# Patient Record
Sex: FEMALE | Race: WHITE | NOT HISPANIC OR LATINO | Employment: FULL TIME | URBAN - METROPOLITAN AREA
[De-identification: names, ages, dates, MRNs, and addresses within clinical notes are randomized per-mention and may not be internally consistent; named-entity substitution may affect disease eponyms.]

---

## 2020-03-01 ENCOUNTER — TRANSFERRED RECORDS (OUTPATIENT)
Dept: HEALTH INFORMATION MANAGEMENT | Facility: CLINIC | Age: 35
End: 2020-03-01

## 2020-04-24 ENCOUNTER — APPOINTMENT (OUTPATIENT)
Dept: MRI IMAGING | Facility: CLINIC | Age: 35
End: 2020-04-24
Attending: EMERGENCY MEDICINE
Payer: COMMERCIAL

## 2020-04-24 ENCOUNTER — APPOINTMENT (OUTPATIENT)
Dept: CT IMAGING | Facility: CLINIC | Age: 35
End: 2020-04-24
Attending: EMERGENCY MEDICINE
Payer: COMMERCIAL

## 2020-04-24 ENCOUNTER — HOSPITAL ENCOUNTER (INPATIENT)
Facility: CLINIC | Age: 35
LOS: 2 days | Discharge: HOME | End: 2020-04-26
Attending: EMERGENCY MEDICINE | Admitting: INTERNAL MEDICINE
Payer: COMMERCIAL

## 2020-04-24 DIAGNOSIS — E87.6 HYPOKALEMIA: ICD-10-CM

## 2020-04-24 DIAGNOSIS — R29.898 TRANSIENT LEFT LEG WEAKNESS: ICD-10-CM

## 2020-04-24 DIAGNOSIS — Q21.12 PFO (PATENT FORAMEN OVALE): ICD-10-CM

## 2020-04-24 DIAGNOSIS — H53.123 TRANSIENT VISUAL LOSS OF BOTH EYES: ICD-10-CM

## 2020-04-24 DIAGNOSIS — I63.9 ACUTE CVA (CEREBROVASCULAR ACCIDENT) (H): Primary | ICD-10-CM

## 2020-04-24 LAB
ALBUMIN SERPL-MCNC: 4.1 G/DL (ref 3.4–5)
ALP SERPL-CCNC: 84 U/L (ref 40–150)
ALT SERPL W P-5'-P-CCNC: 22 U/L (ref 0–50)
ANION GAP SERPL CALCULATED.3IONS-SCNC: 6 MMOL/L (ref 3–14)
APTT PPP: 26 SEC (ref 22–37)
AST SERPL W P-5'-P-CCNC: 16 U/L (ref 0–45)
BASOPHILS # BLD AUTO: 0.1 10E9/L (ref 0–0.2)
BASOPHILS NFR BLD AUTO: 0.7 %
BILIRUB DIRECT SERPL-MCNC: 0.2 MG/DL (ref 0–0.2)
BILIRUB SERPL-MCNC: 0.9 MG/DL (ref 0.2–1.3)
BUN SERPL-MCNC: 11 MG/DL (ref 7–30)
CALCIUM SERPL-MCNC: 8.6 MG/DL (ref 8.5–10.1)
CHLORIDE SERPL-SCNC: 108 MMOL/L (ref 94–109)
CO2 SERPL-SCNC: 27 MMOL/L (ref 20–32)
CREAT SERPL-MCNC: 0.8 MG/DL (ref 0.52–1.04)
DIFFERENTIAL METHOD BLD: NORMAL
EOSINOPHIL # BLD AUTO: 0.5 10E9/L (ref 0–0.7)
EOSINOPHIL NFR BLD AUTO: 5 %
ERYTHROCYTE [DISTWIDTH] IN BLOOD BY AUTOMATED COUNT: 13.9 % (ref 10–15)
GFR SERPL CREATININE-BSD FRML MDRD: >90 ML/MIN/{1.73_M2}
GLUCOSE BLDC GLUCOMTR-MCNC: 105 MG/DL (ref 70–99)
GLUCOSE SERPL-MCNC: 94 MG/DL (ref 70–99)
HBA1C MFR BLD: 4.9 % (ref 0–5.6)
HCG SERPL QL: NEGATIVE
HCT VFR BLD AUTO: 42.9 % (ref 35–47)
HGB BLD-MCNC: 14.3 G/DL (ref 11.7–15.7)
IMM GRANULOCYTES # BLD: 0 10E9/L (ref 0–0.4)
IMM GRANULOCYTES NFR BLD: 0.3 %
INR PPP: 1.09 (ref 0.86–1.14)
LYMPHOCYTES # BLD AUTO: 2.9 10E9/L (ref 0.8–5.3)
LYMPHOCYTES NFR BLD AUTO: 32.2 %
MCH RBC QN AUTO: 30.9 PG (ref 26.5–33)
MCHC RBC AUTO-ENTMCNC: 33.3 G/DL (ref 31.5–36.5)
MCV RBC AUTO: 93 FL (ref 78–100)
MONOCYTES # BLD AUTO: 0.8 10E9/L (ref 0–1.3)
MONOCYTES NFR BLD AUTO: 9.3 %
NEUTROPHILS # BLD AUTO: 4.7 10E9/L (ref 1.6–8.3)
NEUTROPHILS NFR BLD AUTO: 52.5 %
NRBC # BLD AUTO: 0 10*3/UL
NRBC BLD AUTO-RTO: 0 /100
PLATELET # BLD AUTO: 260 10E9/L (ref 150–450)
POTASSIUM SERPL-SCNC: 2.8 MMOL/L (ref 3.4–5.3)
PROT SERPL-MCNC: 7.5 G/DL (ref 6.8–8.8)
RBC # BLD AUTO: 4.63 10E12/L (ref 3.8–5.2)
SARS-COV-2 RNA SPEC QL NAA+PROBE: NORMAL
SODIUM SERPL-SCNC: 141 MMOL/L (ref 133–144)
SPECIMEN SOURCE: NORMAL
TROPONIN I SERPL-MCNC: <0.015 UG/L (ref 0–0.04)
TSH SERPL DL<=0.005 MIU/L-ACNC: 1.48 MU/L (ref 0.4–4)
WBC # BLD AUTO: 8.9 10E9/L (ref 4–11)

## 2020-04-24 PROCEDURE — 25000132 ZZH RX MED GY IP 250 OP 250 PS 637: Performed by: PHYSICIAN ASSISTANT

## 2020-04-24 PROCEDURE — 99291 CRITICAL CARE FIRST HOUR: CPT | Mod: 25

## 2020-04-24 PROCEDURE — A9585 GADOBUTROL INJECTION: HCPCS | Performed by: EMERGENCY MEDICINE

## 2020-04-24 PROCEDURE — 25500064 ZZH RX 255 OP 636: Performed by: EMERGENCY MEDICINE

## 2020-04-24 PROCEDURE — 80076 HEPATIC FUNCTION PANEL: CPT | Performed by: EMERGENCY MEDICINE

## 2020-04-24 PROCEDURE — 85730 THROMBOPLASTIN TIME PARTIAL: CPT | Performed by: EMERGENCY MEDICINE

## 2020-04-24 PROCEDURE — 70450 CT HEAD/BRAIN W/O DYE: CPT

## 2020-04-24 PROCEDURE — 93005 ELECTROCARDIOGRAM TRACING: CPT

## 2020-04-24 PROCEDURE — G0509 CRIT CARE TELEHEA CONSULT 50: HCPCS | Performed by: PHYSICIAN ASSISTANT

## 2020-04-24 PROCEDURE — 87635 SARS-COV-2 COVID-19 AMP PRB: CPT | Performed by: EMERGENCY MEDICINE

## 2020-04-24 PROCEDURE — 83036 HEMOGLOBIN GLYCOSYLATED A1C: CPT | Performed by: EMERGENCY MEDICINE

## 2020-04-24 PROCEDURE — 25000125 ZZHC RX 250: Performed by: EMERGENCY MEDICINE

## 2020-04-24 PROCEDURE — 25000128 H RX IP 250 OP 636: Performed by: EMERGENCY MEDICINE

## 2020-04-24 PROCEDURE — 00000146 ZZHCL STATISTIC GLUCOSE BY METER IP

## 2020-04-24 PROCEDURE — 84484 ASSAY OF TROPONIN QUANT: CPT | Performed by: PHYSICIAN ASSISTANT

## 2020-04-24 PROCEDURE — 84703 CHORIONIC GONADOTROPIN ASSAY: CPT | Performed by: EMERGENCY MEDICINE

## 2020-04-24 PROCEDURE — 84443 ASSAY THYROID STIM HORMONE: CPT | Performed by: EMERGENCY MEDICINE

## 2020-04-24 PROCEDURE — 82947 ASSAY GLUCOSE BLOOD QUANT: CPT | Performed by: PHYSICIAN ASSISTANT

## 2020-04-24 PROCEDURE — 84484 ASSAY OF TROPONIN QUANT: CPT | Performed by: EMERGENCY MEDICINE

## 2020-04-24 PROCEDURE — 96366 THER/PROPH/DIAG IV INF ADDON: CPT

## 2020-04-24 PROCEDURE — 25000132 ZZH RX MED GY IP 250 OP 250 PS 637: Performed by: INTERNAL MEDICINE

## 2020-04-24 PROCEDURE — 25000132 ZZH RX MED GY IP 250 OP 250 PS 637: Performed by: EMERGENCY MEDICINE

## 2020-04-24 PROCEDURE — 80048 BASIC METABOLIC PNL TOTAL CA: CPT | Performed by: EMERGENCY MEDICINE

## 2020-04-24 PROCEDURE — 70553 MRI BRAIN STEM W/O & W/DYE: CPT

## 2020-04-24 PROCEDURE — 0042T CT HEAD PERFUSION WITH CONTRAST: CPT

## 2020-04-24 PROCEDURE — 96365 THER/PROPH/DIAG IV INF INIT: CPT | Mod: 59

## 2020-04-24 PROCEDURE — 12000000 ZZH R&B MED SURG/OB

## 2020-04-24 PROCEDURE — 99222 1ST HOSP IP/OBS MODERATE 55: CPT | Mod: AI | Performed by: PHYSICIAN ASSISTANT

## 2020-04-24 PROCEDURE — 84132 ASSAY OF SERUM POTASSIUM: CPT | Performed by: PHYSICIAN ASSISTANT

## 2020-04-24 PROCEDURE — 70496 CT ANGIOGRAPHY HEAD: CPT

## 2020-04-24 PROCEDURE — 85025 COMPLETE CBC W/AUTO DIFF WBC: CPT | Performed by: EMERGENCY MEDICINE

## 2020-04-24 PROCEDURE — 85610 PROTHROMBIN TIME: CPT | Performed by: EMERGENCY MEDICINE

## 2020-04-24 RX ORDER — ASPIRIN 325 MG
325 TABLET ORAL DAILY
Qty: 14 TABLET | Refills: 0 | Status: SHIPPED | OUTPATIENT
Start: 2020-04-24 | End: 2020-06-18

## 2020-04-24 RX ORDER — PROCHLORPERAZINE MALEATE 5 MG
10 TABLET ORAL EVERY 6 HOURS PRN
Status: DISCONTINUED | OUTPATIENT
Start: 2020-04-24 | End: 2020-04-26 | Stop reason: HOSPADM

## 2020-04-24 RX ORDER — ONDANSETRON 4 MG/1
4 TABLET, ORALLY DISINTEGRATING ORAL EVERY 6 HOURS PRN
Status: DISCONTINUED | OUTPATIENT
Start: 2020-04-24 | End: 2020-04-26 | Stop reason: HOSPADM

## 2020-04-24 RX ORDER — NICOTINE 21 MG/24HR
1 PATCH, TRANSDERMAL 24 HOURS TRANSDERMAL DAILY
Status: DISCONTINUED | OUTPATIENT
Start: 2020-04-24 | End: 2020-04-26 | Stop reason: HOSPADM

## 2020-04-24 RX ORDER — HYDROXYZINE HYDROCHLORIDE 25 MG/1
25-50 TABLET, FILM COATED ORAL EVERY 6 HOURS PRN
Status: DISCONTINUED | OUTPATIENT
Start: 2020-04-24 | End: 2020-04-26 | Stop reason: HOSPADM

## 2020-04-24 RX ORDER — POTASSIUM CHLORIDE 1500 MG/1
40 TABLET, EXTENDED RELEASE ORAL ONCE
Status: COMPLETED | OUTPATIENT
Start: 2020-04-24 | End: 2020-04-24

## 2020-04-24 RX ORDER — HYDRALAZINE HYDROCHLORIDE 20 MG/ML
10-20 INJECTION INTRAMUSCULAR; INTRAVENOUS
Status: DISCONTINUED | OUTPATIENT
Start: 2020-04-24 | End: 2020-04-26 | Stop reason: HOSPADM

## 2020-04-24 RX ORDER — AMOXICILLIN 250 MG
2 CAPSULE ORAL 2 TIMES DAILY PRN
Status: DISCONTINUED | OUTPATIENT
Start: 2020-04-24 | End: 2020-04-26 | Stop reason: HOSPADM

## 2020-04-24 RX ORDER — OXYCODONE HYDROCHLORIDE 5 MG/1
5-10 TABLET ORAL EVERY 4 HOURS PRN
Status: DISCONTINUED | OUTPATIENT
Start: 2020-04-24 | End: 2020-04-26 | Stop reason: HOSPADM

## 2020-04-24 RX ORDER — POTASSIUM CHLORIDE 1500 MG/1
20-40 TABLET, EXTENDED RELEASE ORAL
Status: DISCONTINUED | OUTPATIENT
Start: 2020-04-24 | End: 2020-04-26 | Stop reason: HOSPADM

## 2020-04-24 RX ORDER — POTASSIUM CHLORIDE 29.8 MG/ML
20 INJECTION INTRAVENOUS
Status: DISCONTINUED | OUTPATIENT
Start: 2020-04-24 | End: 2020-04-26 | Stop reason: HOSPADM

## 2020-04-24 RX ORDER — BISACODYL 10 MG
10 SUPPOSITORY, RECTAL RECTAL DAILY PRN
Status: DISCONTINUED | OUTPATIENT
Start: 2020-04-24 | End: 2020-04-26 | Stop reason: HOSPADM

## 2020-04-24 RX ORDER — ACETAMINOPHEN 325 MG/1
650 TABLET ORAL EVERY 4 HOURS PRN
Status: DISCONTINUED | OUTPATIENT
Start: 2020-04-24 | End: 2020-04-26 | Stop reason: HOSPADM

## 2020-04-24 RX ORDER — POTASSIUM CL/LIDO/0.9 % NACL 10MEQ/0.1L
10 INTRAVENOUS SOLUTION, PIGGYBACK (ML) INTRAVENOUS ONCE
Status: COMPLETED | OUTPATIENT
Start: 2020-04-24 | End: 2020-04-24

## 2020-04-24 RX ORDER — POTASSIUM CL/LIDO/0.9 % NACL 10MEQ/0.1L
10 INTRAVENOUS SOLUTION, PIGGYBACK (ML) INTRAVENOUS
Status: DISCONTINUED | OUTPATIENT
Start: 2020-04-24 | End: 2020-04-26 | Stop reason: HOSPADM

## 2020-04-24 RX ORDER — POTASSIUM CHLORIDE 7.45 MG/ML
10 INJECTION INTRAVENOUS
Status: DISCONTINUED | OUTPATIENT
Start: 2020-04-24 | End: 2020-04-26 | Stop reason: HOSPADM

## 2020-04-24 RX ORDER — ASPIRIN 325 MG
325 TABLET ORAL DAILY
Status: DISCONTINUED | OUTPATIENT
Start: 2020-04-25 | End: 2020-04-26 | Stop reason: HOSPADM

## 2020-04-24 RX ORDER — PROCHLORPERAZINE 25 MG
25 SUPPOSITORY, RECTAL RECTAL EVERY 12 HOURS PRN
Status: DISCONTINUED | OUTPATIENT
Start: 2020-04-24 | End: 2020-04-26 | Stop reason: HOSPADM

## 2020-04-24 RX ORDER — ACETAMINOPHEN 325 MG/1
975 TABLET ORAL ONCE
Status: COMPLETED | OUTPATIENT
Start: 2020-04-24 | End: 2020-04-24

## 2020-04-24 RX ORDER — GADOBUTROL 604.72 MG/ML
7.5 INJECTION INTRAVENOUS ONCE
Status: COMPLETED | OUTPATIENT
Start: 2020-04-24 | End: 2020-04-24

## 2020-04-24 RX ORDER — NAPROXEN SODIUM 220 MG
220-440 TABLET ORAL 2 TIMES DAILY PRN
Status: ON HOLD | COMMUNITY
End: 2020-06-23

## 2020-04-24 RX ORDER — IBUPROFEN 600 MG/1
600 TABLET, FILM COATED ORAL EVERY 6 HOURS PRN
Status: DISCONTINUED | OUTPATIENT
Start: 2020-04-24 | End: 2020-04-26 | Stop reason: HOSPADM

## 2020-04-24 RX ORDER — LIDOCAINE 40 MG/G
CREAM TOPICAL
Status: DISCONTINUED | OUTPATIENT
Start: 2020-04-24 | End: 2020-04-26 | Stop reason: HOSPADM

## 2020-04-24 RX ORDER — LABETALOL 20 MG/4 ML (5 MG/ML) INTRAVENOUS SYRINGE
10-40 EVERY 10 MIN PRN
Status: DISCONTINUED | OUTPATIENT
Start: 2020-04-24 | End: 2020-04-26 | Stop reason: HOSPADM

## 2020-04-24 RX ORDER — GABAPENTIN 100 MG/1
100 CAPSULE ORAL 3 TIMES DAILY
COMMUNITY
End: 2020-06-18

## 2020-04-24 RX ORDER — POTASSIUM CHLORIDE 1.5 G/1.58G
20-40 POWDER, FOR SOLUTION ORAL
Status: DISCONTINUED | OUTPATIENT
Start: 2020-04-24 | End: 2020-04-26 | Stop reason: HOSPADM

## 2020-04-24 RX ORDER — LANOLIN ALCOHOL/MO/W.PET/CERES
3 CREAM (GRAM) TOPICAL
Status: DISCONTINUED | OUTPATIENT
Start: 2020-04-24 | End: 2020-04-26 | Stop reason: HOSPADM

## 2020-04-24 RX ORDER — ONDANSETRON 2 MG/ML
4 INJECTION INTRAMUSCULAR; INTRAVENOUS EVERY 6 HOURS PRN
Status: DISCONTINUED | OUTPATIENT
Start: 2020-04-24 | End: 2020-04-26 | Stop reason: HOSPADM

## 2020-04-24 RX ORDER — IOPAMIDOL 755 MG/ML
500 INJECTION, SOLUTION INTRAVASCULAR ONCE
Status: COMPLETED | OUTPATIENT
Start: 2020-04-24 | End: 2020-04-24

## 2020-04-24 RX ORDER — AMOXICILLIN 250 MG
1 CAPSULE ORAL 2 TIMES DAILY PRN
Status: DISCONTINUED | OUTPATIENT
Start: 2020-04-24 | End: 2020-04-26 | Stop reason: HOSPADM

## 2020-04-24 RX ORDER — ASPIRIN 325 MG
325 TABLET ORAL ONCE
Status: COMPLETED | OUTPATIENT
Start: 2020-04-24 | End: 2020-04-24

## 2020-04-24 RX ORDER — NALOXONE HYDROCHLORIDE 0.4 MG/ML
.1-.4 INJECTION, SOLUTION INTRAMUSCULAR; INTRAVENOUS; SUBCUTANEOUS
Status: DISCONTINUED | OUTPATIENT
Start: 2020-04-24 | End: 2020-04-26 | Stop reason: HOSPADM

## 2020-04-24 RX ADMIN — Medication 10 MEQ: at 15:59

## 2020-04-24 RX ADMIN — ASPIRIN 325 MG ORAL TABLET 325 MG: 325 PILL ORAL at 18:45

## 2020-04-24 RX ADMIN — POTASSIUM CHLORIDE 40 MEQ: 1500 TABLET, EXTENDED RELEASE ORAL at 15:59

## 2020-04-24 RX ADMIN — IOPAMIDOL 120 ML: 755 INJECTION, SOLUTION INTRAVENOUS at 15:06

## 2020-04-24 RX ADMIN — NICOTINE 1 PATCH: 14 PATCH, EXTENDED RELEASE TRANSDERMAL at 23:27

## 2020-04-24 RX ADMIN — SODIUM CHLORIDE 95 ML: 9 INJECTION, SOLUTION INTRAVENOUS at 15:06

## 2020-04-24 RX ADMIN — IBUPROFEN 600 MG: 600 TABLET, FILM COATED ORAL at 20:31

## 2020-04-24 RX ADMIN — ACETAMINOPHEN 975 MG: 325 TABLET, FILM COATED ORAL at 18:45

## 2020-04-24 RX ADMIN — GADOBUTROL 6.5 ML: 604.72 INJECTION INTRAVENOUS at 16:23

## 2020-04-24 ASSESSMENT — ENCOUNTER SYMPTOMS
DIZZINESS: 1
NERVOUS/ANXIOUS: 1
WEAKNESS: 1
LIGHT-HEADEDNESS: 1
HEADACHES: 1

## 2020-04-24 ASSESSMENT — ACTIVITIES OF DAILY LIVING (ADL)
ADLS_ACUITY_SCORE: 11
SWALLOWING: 0-->SWALLOWS FOODS/LIQUIDS WITHOUT DIFFICULTY

## 2020-04-24 ASSESSMENT — MIFFLIN-ST. JEOR: SCORE: 1457.92

## 2020-04-24 NOTE — ED PROVIDER NOTES
History     Chief Complaint:  Vision loss    HPI   Teri Call is a 34 year old female who presents to the emergency department for evaluation of vision loss and headache. The patient reports she was at work about 30 minutes ago when she experienced sudden onset of sharp headache and global vision loss, which somewhat improved after several minutes. She denies loss of consciousness. Immediately following symptom onset her boss drove her to the emergency department. Currently she reports her left eye feels numb and she has significantly blurred vision, bilaterally, left worse than right. She continues to report sharp headache as well as lightheadedness, dizziness, and left sided weakness. She is very anxious and has difficulty focusing on anything side from the anxiety.     Allergies:  Sulfa     Medications:    Sertraline  Gabapentin      Past Medical History:    The patient denies any significant past medical history.    Past Surgical History:    The patient does not have any pertinent past surgical history.    Family History:    No past pertinent family history.    Social History:  The patient was accompanied to the ED by her boss.  Smoking Status: Never Smoker  Smokeless Tobacco: Never Used  Alcohol Use: Yes  Drug Use: No    Review of Systems   Eyes: Positive for visual disturbance.   Neurological: Positive for dizziness, weakness, light-headedness and headaches.   Psychiatric/Behavioral: The patient is nervous/anxious.    All other systems reviewed and are negative.    Physical Exam     Patient Vitals for the past 24 hrs:   BP Temp Temp src Pulse Resp SpO2   04/24/20 1437 -- 99.4  F (37.4  C) Temporal -- -- --   04/24/20 1432 132/81 -- -- 80 18 98 %       Physical Exam    General:   Pleasant, , anxious female.  HEENT:    Oropharynx is moist, without lesions or trismus.     Dentition is poor  Eyes:    Conjunctiva normal     PERRL     Left homonymous hemianopsia      Right and left eye will not move  left of midline      Probable left sided neglect  Neck:    Supple, no meningismus.     CV:     Regular rate and rhythm.      No murmurs, rubs or gallops.       No unilateral leg swelling.       2+ radial pulses bilateral.    PULM:    Clear to auscultation bilateral.       No respiratory distress.      Good air exchange.  ABD:    Soft, non-tender, non-distended.       No pulsatile masses.       No rebound, guarding or rigidity.  MSK:     No gross deformity to all four extremities.   LYMPH:   No cervical lymphadenopathy.  NEURO:   Alert & O x 3.      CN II-XII intact,      Speech is clear with no aphasia.       Finger to nose within normal limits.  No pronator drift.       Strength is 4/5 LUE; 5/5 in RUE      3/5 LLE; 5/5 in RLE     No clonus     Down going Babinski bilateral     Sensation is intact.       Normal muscular tone, no tremor.  Skin:    Warm, dry and intact.    Psych:    Mood is good and affect is appropriate.        [employer] Stroke Scale  Exam Interval: Baseline   Score    Level of consciousness: (0)   Alert, keenly responsive    LOC questions: (0)   Answers both questions correctly    LOC commands: (0)   Performs both tasks correctly    Best gaze: (1)   Partial gaze palsy    Visual: (2)   Complete hemianopia    Facial palsy: (0)   Normal symmetrical movements    Motor arm (left): (0)   No drift    Motor arm (right): (0)   No drift    Motor leg (left): (2)   Some effort against gravity    Motor leg (right): (0)   No drift    Limb ataxia: (0)   Absent    Sensory: (0)   Normal- no sensory loss    Best language: (0)   Normal- no aphasia    Dysarthria: (0)   Normal    Extinction and inattention: (1)   Visual, tacile, auditory, spatial, person inattention        Total Score:  6     ABCD2 Score for TIA (calculator)  Background  Calculates overall risk of future TIA based on 5 factors: Age>=60, SBP>140/90, weakness, impaired speech, duration, diabetes.  Data  34 year old  does not have a  problem list on file.  Criteria   Of possible 7 points for 6 possible items  2 points: Unilateral Focal weakness  1 point: Duration 10-59 minutes  Interpretation  ABCD Score: 3  Points 0-3: Risk of CVA 1.0% within the subsequent 2 days of TIA        Emergency Department Course     ECG:  ECG taken at 1437, ECG read at 1441  Normal sinus rhythm with sinus arrhythmia   Normal ECG    Rate 80 bpm. RI interval 128 ms. QRS duration 88 ms. QT/QTc 394/454 ms. P-R-T axes 54 76 68.    Imaging:  Radiology findings were communicated with the patient who voiced understanding of the findings.    Head CT  IMPRESSION:   No evidence of acute intracranial hemorrhage, mass, or  herniation.      MARI PARADA MD    CT ANGIOGRAM OF THE HEAD AND NECK WITH CONTRAST  CT HEAD PERFUSION WITH CONTRAST  4/24/2020 3:07 PM     IMPRESSION:   1. Patent arteries in the head and neck without vascular cutoff. No  evidence of dissection. No aneurysm identified. No significant  stenosis.  2. CT perfusion images demonstrate questionable abnormality in the  right occipital lobe which may be artifactual. Given the history, this  could also represent an area of ischemia. This could be better  assessed with brain MR if the patient is able.     Results discussed with Johnny March at 3:09 PM on 4/24/2020.      MARI PARADA MD      Laboratory:  Laboratory findings were communicated with the patient who voiced understanding of the findings.    Troponin(1434): <0.015    Glucose by meter(1445): 105    BMP: Glucose 94, K 2.8 (low), o/w WNL (Creatinine: 0.8)  CBC: WBC: 8.9, HGB: 14.3, PLT: 260    Partial thromboplastin time: 26  INR: 1.09  HCG Qualitative Pregnancy: negative      Emergency Department Course:  Past medical records, nursing notes, and vitals reviewed.    1430 Patient arrived by person transport.    1431 I performed an exam of the patient as documented above.     1434 Code stroke initiated.    1436 Patient departed for CT.     EKG obtained in the  ED, see results above.     IV was inserted and blood was drawn for laboratory testing, results above.    The patient was sent for CT head perfusion with and without contrast and CTA head and neck while in the emergency department, results above.     Consult with stroke neurology    1509 I consulted with radiology, regarding the patient's history and presentation here in the emergency department.      1512 I rechecked the patient. Strength now 4/5 in LUE/LLE; improved from prior. Patient feeling improved. Tele-neurology evaluation active.    Consult with stroke neurology    1535 I rechecked the patient. EOMs and visual fields now intact.    1550  Changed over to Dr. Flores    Impression & Plan       Medical Decision Makin-year-old female presented to the ED with an onset of vision loss and weakness.  On exam she appeared to have left-sided neglect with left homonymous hemianopsia and left-sided hemiparesis with lower extremity greater than upper extremity involvement.  There are no obvious stroke mimics noted.  Patient underwent code stroke process.  Fortunately head CT reveals no intracranial hemorrhage.  CTA and perfusion studies revealed no vascular cutoff and a questionable right occipital ischemia versus artifact.  On reexamination, patient is returning to her baseline.  Due to the rapid improvement and lack of convincing radiographic findings, patient is not a TPA candidate.  Tele-stroke was involved in the care.  They have recommended MRI which is ordered and pending.  If MRI positive for stroke, patient will require admission.  If MRI is negative, this may represent atypical migraine versus TIA.  In event of TIA, ABCD2 score is 3 and would be amenable to urgent outpatient neurology follow-up.  I will have Dr. Flores follow-up on the MRI and if negative, discharge home with close neurology follow-up versus admission for abnormal MRI.    Diagnosis:    ICD-10-CM    1. Transient left leg weakness   R29.898 HCG QUALitative pregnancy (blood)   2. Transient visual loss of both eyes  H53.123        Disposition:  Care changed over to Dr. Flores    Scribe Disclosure:  I, Merle Brenner, am serving as a scribe at 2:38 PM on 4/24/2020 to document services personally performed by Johnny March MD based on my observations and the provider's statements to me.        Johnny March MD  04/25/20 0626

## 2020-04-24 NOTE — CONSULTS
[facility]    Stroke Consult Note    Reason for Consult: stroke code    Chief Complaint: Dizziness      HPI  Teri Call is a 34 year old right handed woman with a past medical history of migraines (reports 2 per month) who presents to the hospital at 1430 with severe headache and left side weakness which began suddenly 30 minutes prior. She reports she was at work when she had sudden pain in the back of her head and couldn't see well in her left vision. She also felt very hot as if she was about to pass out at that time.     CT head and CTA head/neck were unremarkable. CTP showed possible area of decreased perfusion R occipital area, questionably artifactual. On arriving to the ED her systolic BP was in the 130s and she was weak on the left side. Per the ED physician initally NIHSS was 6. On returning from CT scan the patient reported she was feeling better. She felt stronger in her arm and leg. During the interview at first she stated she had trouble seeing the left side of my face on the screen but by the end of the interview she reported this had improved (Although vision was not 100% back to normal). She continued to complain of severe headache.     TPA Treatment   Not given due to stroke mimic: possible migraine due to migraine history and severe headache currently, minor / isolated / quickly resolving stroke symptoms.    Endovascular Treatment  Not initiated due to absence of proximal vessel occlusion    Impression  Headache, left side weakness, left visual field alteration of vision, migraine versus stroke    Recommendations  - Stat brain MRI  - Permissive HTN  - Call stroke code again if patient worsens    Patient Follow-up    - final recommendation pending work-up    Thank you for this consult. We will continue to follow.  Dr. Eric Devine also examined the patient during the video call.    Loretta Tomlin PA-C  Neurology  04/24/2020 3:33 PM  To page me or covering stroke neurology team  "member, click here: [facility]  Choose \"On Call\" tab at top, then search dropdown box for \"Neurology Adult\" & press Enter, look for Neuro ICU/Stroke    ______________________________________________________    Past Medical History   No past medical history on file.  Past Surgical History   No past surgical history on file.  Medications   Home Meds  Prior to Admission medications    Not on File       Scheduled Meds    potassium chloride with lidocaine  10 mEq Intravenous Once       Infusion Meds      PRN Meds      Allergies   Allergies   Allergen Reactions     Sulfa Drugs Anaphylaxis     Family History   No family history on file.  Social History   Social History     Tobacco Use     Smoking status: Not on file   Substance Use Topics     Alcohol use: Not on file     Drug use: Not on file       Review of Systems   The 10 point Review of Systems is negative other than noted in the HPI or here.        PHYSICAL EXAMINATION  Temp:  [99.4  F (37.4  C)] 99.4  F (37.4  C)  Pulse:  [80] 80  Resp:  [18] 18  BP: (132)/(81) 132/81  SpO2:  [98 %] 98 %     General:  patient lying in bed without any acute distress    HEENT:  normocephalic/atraumatic  Pulmonary:  no respiratory distress    Neurologic  Mental Status:  alert, oriented x 3, follows commands, speech clear and fluent, naming and repetition normal  Cranial Nerves:  visual fields intact (tested by nurse on three separate occasions during the stroke code video call, high quality of VF testing was done by her), EOMI with normal smooth pursuit, facial sensation intact and symmetric (tested by nurse), facial movements symmetric, hearing not formally tested but intact to conversation, no dysarthria, shoulder shrug equal bilaterally, tongue protrusion midline  Motor:  no abnormal movements, able to move all limbs antigravity spontaneously with no signs of hemiparesis observed, no pronator drift. No fix with rapid arm rolling (aka no orbiting)  Reflexes:  unable to test " (telestroke)  Sensory:  light touch sensation intact and symmetric throughout upper and lower extremities (assessed by nurse), no extinction on double simultaneous stimulation (assessed by nurse)  Coordination:  normal finger-to-nose and heel-to-shin bilaterally without dysmetria, rapid alternating movements symmetric  Station/Gait:  unable to test due to telestroke      Dysphagia Screen  Per Nursing    Stroke Scales    NIHSS  Interval     Interval Comments     1a. Level of Consciousness 0-->Alert, keenly responsive   1b. LOC Questions 0-->Answers both questions correctly   1c. LOC Commands 0-->Performs both tasks correctly   2.   Best Gaze 0-->Normal   3.   Visual 0-->No visual loss   4.   Facial Palsy 0-->Normal symmetrical movements   5a. Motor Arm, Left 0-->No drift, limb holds 90 (or 45) degrees for full 10 secs   5b. Motor Arm, Right 0-->No drift, limb holds 90 (or 45) degrees for full 10 secs   6a. Motor Leg, Left 0-->No drift, leg holds 30 degree position for full 5 secs   6b. Motor Leg, right 0-->No drift, leg holds 30 degree position for full 5 secs   7.   Limb Ataxia 0-->Absent   8.   Sensory 0-->Normal, no sensory loss   9.   Best Language 0-->No aphasia, normal   10. Dysarthria 0-->Normal   11. Extinction and Inattention  0-->No abnormality   Total 0 (04/24/20 1529) was done at 15:10 documented at 1529       Imaging  I personally reviewed all imaging; relevant findings per HPI.     Lab Results Data   CBC  Recent Labs   Lab 04/24/20  1434   WBC 8.9   RBC 4.63   HGB 14.3   HCT 42.9        Basic Metabolic Panel    Recent Labs   Lab 04/24/20  1434      POTASSIUM 2.8*   CHLORIDE 108   CO2 27   BUN 11   CR 0.80   GLC 94   THIAGO 8.6     Liver Panel  No lab results found.  INR  Recent Labs   Lab Test 04/24/20  1434   INR 1.09      Lipid ProfileNo lab results found.  A1CNo lab results found.  Troponin I  Recent Labs   Lab 04/24/20  1434   TROPI <0.015          Stroke Code / Stroke Consult Data Data    Stroke Code Data  (for stroke code with tele)  Stroke code activated 04/24/20   1435   First stroke provider response 04/24/20   1436   Video start time 04/24/20   1503   Video end time 04/24/20   1526   Last known normal 04/24/20   1359   Time of discovery  (or onset of symptoms)  04/24/20   1400   Head CT read by me 04/24/20   1450   Was stroke code de-escalated? Yes 04/24/20 1531  other (see comments) rapid improvement, nih 0      Telestroke Service Details  Type of service telemedicine diagnostic assessment of acute neurological changes   Reason telemedicine is appropriate patient requires assessment with a specialist for diagnosis and treatment of neurological symptoms   Mode of transmission secure interactive audio and video communication per Flory   Originating site (patient location) [facility]    Distant site (provider location) Provider remote site       I have personally spent a total of 50 minutes providing critical care services supervising this patient's stroke code activation.  I personally reviewed all lab values and radiology images.  I evaluated and directed care for the patient's critical condition.

## 2020-04-24 NOTE — LETTER
[place] [address] [facility]  [address] [facility] [address]  [phone]    2020    Re: Teri Call  No address on file.  [phone] (home)     : 1985      To Whom It May Concern:      Teri Call was hospitalized from 2020 until 2020 due to medical illness.  She may return to work on 20 with full duty.        Sincerely,        Ruddy Nelson MD.  Internal Medicine/Hospitalist
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
## 2020-04-24 NOTE — ED NOTES
[facility]  ED Nurse Handoff Report    Teri Call is a 34 year old female   ED Chief complaint: Dizziness  . ED Diagnosis:   Final diagnoses:   Transient left leg weakness   Transient visual loss of both eyes   Hypokalemia     Allergies:   Allergies   Allergen Reactions     Sulfa Drugs Anaphylaxis       Code Status: Full Code  Activity level - Baseline/Home:  Independent. Activity Level - Current:   Stand by Assist. Lift room needed: No. Bariatric: No   Needed: No   Isolation: Yes. Infection: Not Applicable.     Vital Signs:   Vitals:    04/24/20 1612 04/24/20 1613 04/24/20 1615 04/24/20 1700   BP:    123/82   Pulse:    63   Resp: 24 23 18    Temp:       TempSrc:       SpO2: 100% 100% 99%    Weight:       Height:           Cardiac Rhythm:  ,      Pain level:    Patient confused: No. Patient Falls Risk: Yes.   Elimination Status: Has voided   Patient Report - Initial Complaint: unilateral weakness. Focused Assessment: Teri Call is a 34 year old female who presents to the emergency department for evaluation of vision loss and headache. The patient reports she was at work about 30 minutes ago when she experienced sudden onset of sharp headache and global vision loss, which somewhat improved after several minutes. She denies loss of consciousness. Immediately following symptom onset her boss drove her to the emergency department. Currently she reports her left eye feels numb and she has significantly blurred vision, bilaterally, left worse than right. She continues to report sharp headache as well as lightheadedness, dizziness, and left sided weakness. She is very anxious and has difficulty focusing on anything side from the anxiety   14:35 Neurological Cognitive - Cognitive/Neuro/Behavioral WDL: -WDL except (dizziness)  Selina Coma Scale - Best Eye Response: 4-->(E4) spontaneous  Best Motor Response: 6-->(M6) obeys commands  Best Verbal Response: 5-->(V5) oriented  Selina Coma  Scale Score: 15   Motor Strength - Left Upper: 4 - active movement against gravity and some resistance  Right Upper: 5 - active movement against gravity and full resistance  Left Lower: 3 - active movement against gravity  Right Lower: 5 - active movement against gravity and full resistance        Tests Performed: labs, imaging. Abnormal Results:   Labs Ordered and Resulted from Time of ED Arrival Up to the Time of Departure from the ED   GLUCOSE BY METER - Abnormal; Notable for the following components:       Result Value    Glucose 105 (*)     All other components within normal limits   BASIC METABOLIC PANEL - Abnormal; Notable for the following components:    Potassium 2.8 (*)     All other components within normal limits   HCG QUALITATIVE   CBC WITH PLATELETS DIFFERENTIAL   INR   PARTIAL THROMBOPLASTIN TIME   TROPONIN I   COVID-19 VIRUS (CORONAVIRUS) BY PCR   HEPATIC PANEL   TSH WITH FREE T4 REFLEX   HEMOGLOBIN A1C     MR Brain w/o & w Contrast   Final Result   IMPRESSION:     1. Subtle area of restricted diffusion in the right occipital lobe   that appears to be located in the white matter. No corresponding T2   signal abnormality or enhancement. This corresponds to the area of   abnormality on recent CT perfusion. This is concerning for an area of   ischemia. Other etiologies including sequela of migraine headaches   could be considered. Clinical correlation and close follow-up is   recommended.   2. No other intracranial abnormalities. Remainder of the brain is   relatively normal.         MARI PARADA MD      CT Head Perfusion w Contrast   Final Result   IMPRESSION:    1. Patent arteries in the head and neck without vascular cutoff. No   evidence of dissection. No aneurysm identified. No significant   stenosis.   2. CT perfusion images demonstrate questionable abnormality in the   right occipital lobe which may be artifactual. Given the history, this   could also represent an area of ischemia. This could be  better   assessed with brain MR if the patient is able.      Results discussed with Johnny March at 3:09 PM on 4/24/2020.       MARI PARADA MD      CTA Head Neck with Contrast   Final Result   IMPRESSION:    1. Patent arteries in the head and neck without vascular cutoff. No   evidence of dissection. No aneurysm identified. No significant   stenosis.   2. CT perfusion images demonstrate questionable abnormality in the   right occipital lobe which may be artifactual. Given the history, this   could also represent an area of ischemia. This could be better   assessed with brain MR if the patient is able.      Results discussed with Johnny March at 3:09 PM on 4/24/2020.       MARI PARADA MD      CT Head w/o Contrast   Final Result   IMPRESSION:   No evidence of acute intracranial hemorrhage, mass, or   herniation.       MARI PARADA MD        .   Treatments provided: monitoring, see MAR  Family Comments: n/a  OBS brochure/video discussed/provided to patient:  No  ED Medications:   Medications   acetaminophen (TYLENOL) tablet 975 mg (has no administration in time range)   aspirin (ASA) tablet 325 mg (has no administration in time range)   iopamidol (ISOVUE-370) solution 500 mL (120 mLs Intravenous Given 4/24/20 1506)   sodium chloride 0.9 % bag 500mL for CT scan flush use (95 mLs Intravenous Given 4/24/20 1506)   potassium chloride 10 mEq in 100 mL intermittent infusion with 10 mg lidocaine (0 mEq Intravenous Stopped 4/24/20 1734)   potassium chloride ER (KLOR-CON M) CR tablet 40 mEq (40 mEq Oral Given 4/24/20 1559)   gadobutrol (GADAVIST) injection 7.5 mL (6.5 mLs Intravenous Given 4/24/20 1623)     Drips infusing:  No  For the majority of the shift, the patient's behavior Green. Interventions performed were n/a.    Sepsis treatment initiated: No       ED Nurse Name/Phone Number: Wendy Posadas RN,   6:01 PM    RECEIVING UNIT ED HANDOFF REVIEW    Above ED Nurse Handoff Report was reviewed: Yes  Reviewed  by: Marcello Loza RN on April 24, 2020 at 6:46 PM

## 2020-04-24 NOTE — LETTER
[place] [address] [facility]  [address] [facility] [address]  [phone]    2020    Re: Teri Call  No address on file.  [phone] (home)     : 1985      To Whom It May Concern:      Teri Call was hospitalized from 2020 until 2020 due to medical illness.  She may return to work on 20 with full duty.        Sincerely,        Ruddy Nelson MD.   Internal Medical Service/Hospitalist

## 2020-04-25 ENCOUNTER — APPOINTMENT (OUTPATIENT)
Dept: OCCUPATIONAL THERAPY | Facility: CLINIC | Age: 35
End: 2020-04-25
Attending: PHYSICIAN ASSISTANT
Payer: COMMERCIAL

## 2020-04-25 ENCOUNTER — APPOINTMENT (OUTPATIENT)
Dept: ULTRASOUND IMAGING | Facility: CLINIC | Age: 35
End: 2020-04-25
Attending: HOSPITALIST
Payer: COMMERCIAL

## 2020-04-25 ENCOUNTER — APPOINTMENT (OUTPATIENT)
Dept: CARDIOLOGY | Facility: CLINIC | Age: 35
End: 2020-04-25
Attending: PHYSICIAN ASSISTANT
Payer: COMMERCIAL

## 2020-04-25 LAB
CHOLEST SERPL-MCNC: 121 MG/DL
CRP SERPL-MCNC: <2.9 MG/L (ref 0–8)
ERYTHROCYTE [SEDIMENTATION RATE] IN BLOOD BY WESTERGREN METHOD: 4 MM/H (ref 0–20)
GLUCOSE SERPL-MCNC: 111 MG/DL (ref 70–99)
GLUCOSE SERPL-MCNC: 84 MG/DL (ref 70–99)
HDLC SERPL-MCNC: 29 MG/DL
INTERPRETATION ECG - MUSE: NORMAL
LDLC SERPL CALC-MCNC: 83 MG/DL
NONHDLC SERPL-MCNC: 92 MG/DL
POTASSIUM SERPL-SCNC: 3.3 MMOL/L (ref 3.4–5.3)
POTASSIUM SERPL-SCNC: 4.2 MMOL/L (ref 3.4–5.3)
SARS-COV-2 PCR COMMENT: NORMAL
SARS-COV-2 RNA SPEC QL NAA+PROBE: NEGATIVE
SPECIMEN SOURCE: NORMAL
TRIGL SERPL-MCNC: 47 MG/DL
TROPONIN I SERPL-MCNC: <0.015 UG/L (ref 0–0.04)

## 2020-04-25 PROCEDURE — 25800025 ZZH RX 258: Performed by: INTERNAL MEDICINE

## 2020-04-25 PROCEDURE — 86140 C-REACTIVE PROTEIN: CPT | Performed by: HOSPITALIST

## 2020-04-25 PROCEDURE — 93970 EXTREMITY STUDY: CPT

## 2020-04-25 PROCEDURE — 25000132 ZZH RX MED GY IP 250 OP 250 PS 637: Performed by: PHYSICIAN ASSISTANT

## 2020-04-25 PROCEDURE — 36415 COLL VENOUS BLD VENIPUNCTURE: CPT | Performed by: HOSPITALIST

## 2020-04-25 PROCEDURE — 99232 SBSQ HOSP IP/OBS MODERATE 35: CPT | Performed by: HOSPITALIST

## 2020-04-25 PROCEDURE — 99291 CRITICAL CARE FIRST HOUR: CPT | Mod: GO | Performed by: PHYSICIAN ASSISTANT

## 2020-04-25 PROCEDURE — 12000000 ZZH R&B MED SURG/OB

## 2020-04-25 PROCEDURE — 93306 TTE W/DOPPLER COMPLETE: CPT

## 2020-04-25 PROCEDURE — 85652 RBC SED RATE AUTOMATED: CPT | Performed by: HOSPITALIST

## 2020-04-25 PROCEDURE — 97165 OT EVAL LOW COMPLEX 30 MIN: CPT | Mod: GO

## 2020-04-25 PROCEDURE — 82947 ASSAY GLUCOSE BLOOD QUANT: CPT | Performed by: HOSPITALIST

## 2020-04-25 PROCEDURE — 93306 TTE W/DOPPLER COMPLETE: CPT | Mod: 26 | Performed by: INTERNAL MEDICINE

## 2020-04-25 PROCEDURE — 80061 LIPID PANEL: CPT | Performed by: HOSPITALIST

## 2020-04-25 PROCEDURE — 84132 ASSAY OF SERUM POTASSIUM: CPT | Performed by: HOSPITALIST

## 2020-04-25 PROCEDURE — 25000132 ZZH RX MED GY IP 250 OP 250 PS 637: Performed by: INTERNAL MEDICINE

## 2020-04-25 RX ORDER — ACYCLOVIR 200 MG/1
30 CAPSULE ORAL ONCE
Status: COMPLETED | OUTPATIENT
Start: 2020-04-25 | End: 2020-04-25

## 2020-04-25 RX ADMIN — NICOTINE 1 PATCH: 14 PATCH, EXTENDED RELEASE TRANSDERMAL at 08:25

## 2020-04-25 RX ADMIN — ACETAMINOPHEN 650 MG: 325 TABLET, FILM COATED ORAL at 03:25

## 2020-04-25 RX ADMIN — SODIUM CHLORIDE 30 ML: 9 INJECTION, SOLUTION INTRAMUSCULAR; INTRAVENOUS; SUBCUTANEOUS at 13:47

## 2020-04-25 RX ADMIN — ACETAMINOPHEN 650 MG: 325 TABLET, FILM COATED ORAL at 16:28

## 2020-04-25 RX ADMIN — POTASSIUM CHLORIDE 40 MEQ: 1500 TABLET, EXTENDED RELEASE ORAL at 03:11

## 2020-04-25 RX ADMIN — ASPIRIN 325 MG ORAL TABLET 325 MG: 325 PILL ORAL at 08:25

## 2020-04-25 RX ADMIN — POTASSIUM CHLORIDE 20 MEQ: 1500 TABLET, EXTENDED RELEASE ORAL at 04:53

## 2020-04-25 ASSESSMENT — ACTIVITIES OF DAILY LIVING (ADL)
ADLS_ACUITY_SCORE: 11
PREVIOUS_RESPONSIBILITIES: MEAL PREP;HOUSEKEEPING;LAUNDRY;SHOPPING;MEDICATION MANAGEMENT;FINANCES;DRIVING
ADLS_ACUITY_SCORE: 11
ADLS_ACUITY_SCORE: 10
ADLS_ACUITY_SCORE: 13
IADL_COMMENTS: SPOUSE CAN A AT D/C

## 2020-04-25 NOTE — PROGRESS NOTES
X-cover    Called for negative COVID 19 testing  Per H and P testing done due to r/o hypercoagulable state in young person with CVA  No sx suggestive of COVID 19    Discontinue precautions

## 2020-04-25 NOTE — PLAN OF CARE
End of Shift Summary  For vital signs and complete assessments, please see documentation flowsheets.     Pertinent assessments: AO X4. Vision intact. Mild left sided weakness. Numbness/tingling of extremities. Tylenol given for headache. K replaced   Major Shift Events: COVID neg - moved rooms   Treatment Plan: Neuro consult, Echo in AM, K replacement, PT/OT      Discharge Readiness: Medically active  Expected Discharge Date: TBD  Discharge Disposition: Home with Homecare

## 2020-04-25 NOTE — PROGRESS NOTES
"[facility]    Hospitalist Progress Note      Assessment & Plan   Teri Call is a 34 year old female with tobacco abuse, migraines with aura, and history of palpitations and presents to [facility] on 4/24/2020 with acute onset sharp headache, vision loss, and left-sided weakness and is found to have a possible right occipital CVA.      # Right occipital CVA with left hemiparesis and left homonymous hemianopsia: CTP head showed a questionable perfusion abnormality R occipital lobe. MRI brain shows possible small subcortical infarct R occipital lobe, infarct vs migriane change.  Neurology was consulted.  ECG showed sinus rhythm without acute ST segment changes.  Troponin negative x 2.  Lipid profile WNL.  TSH WNL.  Glucose was WNL.  COVID-19 was checked and negative.     ESR and CRP were within normal limits.  She was started on  recommended by Stroke Neuro. PT and OT did see the patient and did recommend outpatient PT for continued balance.    -TTE was performed and was \" strongly positive saline bubble study\" for a significant right to left interatrial shunt.  She will need an outpatient LEVON and cardiology follow-up to discuss possible closure.  Per neurology this should be done after she is seen by hematology in the outpatient setting.  -Given the positive TTE as above, I did order bilateral ultrasounds of the lower extremity.  She describes leg heaviness and tingling leading up to admitting events that are somewhat concerning for possible DVT.  If these are positive then this would likely change her medication management for anticoagulation.  -Maintain telemetry  -Continue  mg daily  -She will need to follow-up with her PCP within 1 week on discharge.  She will need to follow-up with neurology within 4 to 6 weeks with Dr. Mckinney at [facility].  She should see hematology in 6 to 8 weeks for hypercoagulable work-up.  She will also need cardiology follow up for atrial " "defect.  I would also order cardiac monitor upon discharge given her sensation of heart racing/palpitations in the past.  She will need outpatient Transcranial Doppler to be done by Dr. Travis Burden ([facility] [place] website says his clinic # is [phone].  -She was also instructed to avoid triptan medications for migraine headache.     # Tobacco abuse  Strongly advised tobacco cessation.  Patient currently very motivated.    DVT Prophylaxis: Ambulation  Code Status: Full Code  Expected discharge: Anticipate patient should be ready for discharge in next 24 hours.     Morgan Hernandez MD  Text Page    Interval History   Assumed care this a.m.  Patient states that most of her neurologic deficits that she presented with have resolved.  She described vision loss almost as if she was \"driving in the heavy rain.\"  This seemed to involve both eyes but then the left eye.  She then described a left-sided weakness.  She feels like the weakness on her left side has improved.  She does note some balance issues but otherwise feels close to baseline.  Denies any chest pain or chest discomfort.  No palpitations overnight.  No nausea or vomiting.  No fevers or chills    In discussion with the patient, patient does endorse leg heaviness and tingling over the last several weeks for which she was prescribed gabapentin by her PCP.  Also does note palpitations in the past.  Feels as if at times that her heart is racing.    -Data reviewed today: I reviewed all new labs and imaging results over the last 24 hours.    Physical Exam   Temp: 97  F (36.1  C) Temp src: Oral BP: 113/71 Pulse: 65 Heart Rate: 54 Resp: 24 SpO2: 100 % O2 Device: None (Room air)    Vitals:    04/24/20 1605   Weight: 66.7 kg (147 lb)     Vital Signs with Ranges  Temp:  [96.5  F (35.8  C)-98.6  F (37  C)] 97  F (36.1  C)  Pulse:  [58-79] 65  Heart Rate:  [54-77] 54  Resp:  [10-24] 24  BP: ()/(59-95) 113/71  SpO2:  [91 %-100 %] 100 %  I/O last 3 " completed shifts:  In: 2000 [P.O.:2000]  Out: -     Gen: Nontoxic, NAD. A bit anxious  HEENT: MMM, no oral lesions noted. NO elevation of JVD noted  CV: regular, no murmur noted  Resp: Nl WOB. Clear bilaterally. No wheezes or crackles  Abd: BS+, NT, ND  Ext: WWP, no edema  Neuro: ANO x3.  Follows commands.  Speech is clear.  Cranial nerves II through XII are intact.  Strength seems to be 5 out of 5 in all extremities.  Finger-to-nose is normal bilaterally.  Sensation intact in all extremities.    Medications     - MEDICATION INSTRUCTIONS -         aspirin  325 mg Oral Daily     nicotine  1 patch Transdermal Daily     nicotine   Transdermal Q8H     sertraline  50 mg Oral Daily     sodium chloride (PF)  3 mL Intracatheter Q8H       Data   Recent Labs   Lab 04/25/20  0859 04/25/20  0659 04/24/20  2339 04/24/20  1434   WBC  --   --   --  8.9   HGB  --   --   --  14.3   MCV  --   --   --  93   PLT  --   --   --  260   INR  --   --   --  1.09   NA  --   --   --  141   POTASSIUM 4.2  --  3.3* 2.8*   CHLORIDE  --   --   --  108   CO2  --   --   --  27   BUN  --   --   --  11   CR  --   --   --  0.80   ANIONGAP  --   --   --  6   THIAGO  --   --   --  8.6   GLC  --  84 111* 94   ALBUMIN  --   --   --  4.1   PROTTOTAL  --   --   --  7.5   BILITOTAL  --   --   --  0.9   ALKPHOS  --   --   --  84   ALT  --   --   --  22   AST  --   --   --  16   TROPI  --   --  <0.015 <0.015       Recent Results (from the past 24 hour(s))   MR Brain w/o & w Contrast    Narrative    MRI BRAIN WITHOUT AND WITH CONTRAST  4/24/2020 4:52 PM     HISTORY: Left-sided weakness. Transient left-sided vision loss.    TECHNIQUE: Multiplanar, multisequence MRI of the brain without and  with 6.5 mL Gadavist.    COMPARISON: Head CT from earlier the same day.     FINDINGS: Subtle area of restricted diffusion in the right occipital  lobe that appears to be located in the white matter (series 2 image  75). This corresponds to the area of questioned abnormality on  the CT  perfusion images. No corresponding abnormality is seen on the T2  weighted sequence. No evidence of intracranial hemorrhage. No mass  effect or midline shift. No other parenchymal signal abnormality is  identified. Ventricular size is within normal limits without evidence  of hydrocephalus.    There is no abnormal intracranial enhancement.     The facial structures appear normal. The major arterial T2 flow voids  at the base of the brain appear patent.       Impression    IMPRESSION:    1. Subtle area of restricted diffusion in the right occipital lobe  that appears to be located in the white matter. No corresponding T2  signal abnormality or enhancement. This corresponds to the area of  abnormality on recent CT perfusion. This is concerning for an area of  ischemia. Other etiologies including sequela of migraine headaches  could be considered. Clinical correlation and close follow-up is  recommended.  2. No other intracranial abnormalities. Remainder of the brain is  relatively normal.      MARI PARADA MD   Echocardiogram Complete    Narrative    [id number]  [id number]  [id number]  [id number]^PARIS^TEN^[facility]  Echocardiography Laboratory  [address] [place] [address]        Name: PANDA JOHN  MRN: [id number]  : 1985  Study Date: 2020 01:28 PM  Age: 34 yrs  Gender: Female  Patient Location: [facility]  Reason For Study: CVA  Ordering Physician: TEN TOLEDO  Performed By: Laura Whalen RDCS     BSA: 1.9 m2  Height: 71 in  Weight: 147 lb  HR: 58  BP: 123/84 mmHg  _____________________________________________________________________________  __        Procedure  Complete Portable Bubble Echo Adult.  _____________________________________________________________________________  __        Interpretation Summary     1. The atrial septum is aneurysmal with a strongly positive saline bubble  study suggestive of a significant right to left inter-atrial  shunt.  2. Recommend transesophageal echocardiogram to rule out atrial septal defect.  3. Normal atrial size.  4. Normal left ventricular size and systolic function. Estimated LVEF 60-65%.  5. Normal right ventricular size and systolic function.  6. No significant cardiac valve disease.     There is no comparison study available.  _____________________________________________________________________________  __        Left Ventricle  The left ventricle is normal in size. There is normal left ventricular wall  thickness. Left ventricular systolic function is normal. The visual ejection  fraction is estimated at 60-65%. Left ventricular diastolic function is  normal. No regional wall motion abnormalities noted.     Right Ventricle  The right ventricle is normal in size and function.     Atria  Normal left atrial size. Right atrial size is normal. The atrial septum is  aneurysmal. A contrast injection (Bubble Study) was performed that was  severely positive for flow across the interatrial septum.     Mitral Valve  The mitral valve leaflets appear normal. There is no evidence of stenosis,  fluttering, or prolapse. There is trace mitral regurgitation.        Tricuspid Valve  Normal tricuspid valve. There is trace tricuspid regurgitation.     Aortic Valve  The aortic valve is trileaflet. There is trace aortic regurgitation. No  hemodynamically significant valvular aortic stenosis.     Pulmonic Valve  The pulmonic valve is not well visualized. There is trace pulmonic valvular  regurgitation.     Vessels  The aortic root is normal size. Normal size ascending aorta. The inferior vena  cava is normal.     Pericardium  There is no pericardial effusion.        Rhythm  Sinus rhythm was noted.  _____________________________________________________________________________  __  MMode/2D Measurements & Calculations     IVSd: 0.82 cm  LVIDd: 4.1 cm  LVIDs: 2.9 cm  LVPWd: 0.91 cm  FS: 29.1 %  LV mass(C)d: 109.1 grams  LV mass(C)dI:  59.0 grams/m2  Ao root diam: 3.0 cm  LA dimension: 3.0 cm  asc Aorta Diam: 3.2 cm  LA/Ao: 0.98  LVOT diam: 2.0 cm  LVOT area: 3.1 cm2  LA Volume (BP): 39.0 ml  LA Volume Index (BP): 21.1 ml/m2  RWT: 0.44           Doppler Measurements & Calculations  MV E max hector: 88.1 cm/sec  MV A max hector: 68.2 cm/sec  MV E/A: 1.3  MV dec time: 0.18 sec  Ao V2 max: 137.1 cm/sec  Ao max P.0 mmHg  PA acc time: 0.19 sec  E/E' av.8  Lateral E/e': 5.7  Medial E/e': 7.9           _____________________________________________________________________________  __           Report approved by: Dr Desirae Souza 2020 02:37 PM

## 2020-04-25 NOTE — PROGRESS NOTES
04/25/20 0921   Quick Adds   Type of Visit Initial Occupational Therapy Evaluation   Living Environment   Lives With spouse;child(katelin), dependent   Living Arrangements house   Transportation Anticipated car, drives self;family or friend will provide   Living Environment Comment 1 step into home. stairs to basement. IND prior. drives at baseline. spouse in good health to A as needed.    Self-Care   Usual Activity Tolerance good   Current Activity Tolerance moderate   Equipment Currently Used at Home none   Functional Level   Ambulation 0-->independent   Transferring 0-->independent   Toileting 0-->independent   Bathing 0-->independent   Dressing 0-->independent   Eating 0-->independent   Communication 0-->understands/communicates without difficulty   Swallowing 0-->swallows foods/liquids without difficulty   Cognition 0 - no cognition issues reported   Fall history within last six months yes   Number of times patient has fallen within last six months 1   Which of the above functional risks had a recent onset or change? none   General Information   Onset of Illness/Injury or Date of Surgery - Date 04/24/20   Referring Physician Rajan    Patient/Family Goals Statement return home   Additional Occupational Profile Info/Pertinent History of Current Problem  Right occipital CVA with left hemiparesis and left homonymous hemianopsia   Precautions/Limitations fall precautions   Cognitive Status Examination   Orientation orientation to person, place and time   Level of Consciousness alert   Follows Commands (Cognition) WNL   Memory intact   Attention No deficits were identified   Organization/Problem Solving No deficits were identified   Executive Function No deficits were identified   Visual Perception   Visual Perception No deficits were identified   Visual Perception Comments visual field intact. pt reports very mild L sided bluriness but reports it continues to improve   Sensory Examination   Sensory Comments numbness  "LUE and LE    Pain Assessment   Patient Currently in Pain No   Integumentary/Edema   Integumentary/Edema no deficits were identifed   Posture   Posture not impaired   Range of Motion (ROM)   ROM Quick Adds No deficits were identified   Strength   Manual Muscle Testing Quick Adds No deficits were identified   Muscle Tone Assessment   Muscle Tone Quick Adds No deficits were identified   Coordination   Upper Extremity Coordination Left UE impaired   Coordination Comments very mild LUE incoordination   Instrumental Activities of Daily Living (IADL)   Previous Responsibilities meal prep;housekeeping;laundry;shopping;medication management;finances;driving   IADL Comments spouse can A at d/c    Activities of Daily Living Analysis   Impairments Contributing to Impaired Activities of Daily Living balance impaired;sensation decreased;coordination impaired   Clinical Impression   Criteria for Skilled Therapeutic Interventions Met evaluation only   OT Diagnosis assess post CVA for dec sensation and numbness LUE   Influenced by the following impairments impaired sensation and dec balance   Clinical Decision Making (Complexity) Low complexity   Therapy Frequency   (eval only)   Predicted Duration of Therapy Intervention (days/wks) eval only   Anticipated Discharge Disposition Home with Assist;Home with Outpatient Therapy   Risks and Benefits of Treatment have been explained. Yes   Patient, Family & other staff in agreement with plan of care Yes   [facility] TM \"6 Clicks\"   2016, Trustees of [facility], under license to [employer].  All rights reserved.   6 Clicks Short Forms Daily Activity Inpatient Short Form   [facility]  \"6 Clicks\" Daily Activity Inpatient Short Form   1. Putting on and taking off regular lower body clothing? 4 - None   2. Bathing (including washing, rinsing, drying)? 4 - None   3. Toileting, which includes using toilet, bedpan or urinal? 4 - None   4. Putting on and taking " off regular upper body clothing? 4 - None   5. Taking care of personal grooming such as brushing teeth? 4 - None   6. Eating meals? 4 - None   Daily Activity Raw Score (Score out of 24.Lower scores equate to lower levels of function) 24   Total Evaluation Time   Total Evaluation Time (Minutes) 20

## 2020-04-25 NOTE — PLAN OF CARE
VS: stable   Orientation: A/O x4  Glucose checks: midnight 111  Activity: SBA  Diet: regular  GI: WDL  : WDL  Respiratory: WDL  IV: SL  Plan: tele stroke consult and echo today

## 2020-04-25 NOTE — CONSULTS
"  [facility]    Stroke Consult Note    Reason for Consult:  \"CVA\"    Chief Complaint: Dizziness       HPI  Teri Call is a 34 year old right handed woman who I evaluated for at stroke code yesterday, see that note for details. She presented with headache, vision changes, L hemiparesis and L visual field defect but improved significantly by the time of our exam and NIH was 0 so she was not treated with tPA. Today she feels back to normal except for very mild headache which isn't bothering her much.    She reports that she has had migraines since age 9, they usually come on 1-2 times per month when they first began, but lately more like 8 per year, gradual decrease over that time period. They always begin with positive visual phenomena (stars) in vision at the very start of a migraine. Then she has nausea and often vomits, followed by the headache. It is always bilateral with pounding in the front of her head and temples, with associated photophobia and phonophobia. Never had any numbness, tingling, weakness with any migraine. She also never had visual loss with a migraine. She says her migraines are very consistent and follow the same pattern each time with some variability in duration or severity but never in terms of the actual symptoms she has. Her son has hemiplegic migraines. Took immitrex years ago and doesn't think that it helped, isn't on it now. Always takes NSAIDs which help. She never had to go to the ER or hospital or see a doctor for her migraines.    She has a family history of no specific clotting disorder, but she had 1 miscarriage (4 live children) and a few other family members have had at least 1 miscarriage each. Her mother had some blood clots but she doesn't know details. The patient does smoke.     Yesterday she had an episode totally unlike her migraines. She was at work and suddenly she felt very hot, then lightheaded/dizzy and disoriented, \"whole head was shut off\", " "couldn't think, couldn't see at all \"like a downpour when you're driving\" then went and sat down, went in cooler at work. Coworkers took her in there. Then as she was sitting on a chair at work, she had a sudden stabbing pain in the back of her head, a location and quality she never felt in a headache ever before. She then came to the ER. She didn't notice left hemiparesis, only ER doc noticed it. Her symptoms got better gradually and she feels a lot better today.    Stroke Evaluation summarized:  MRI/Head CT: CTP head showed a questionable perfusion abnormality R occipital lobe. MRI brain shows possible small subcortical infarct R occipital lobe, infarct vs migriane change.  Intracranial Vascular Imaging: CTA head normal  Cervical Carotid and Vertebral Artery Vascular Imaging: CTA neck normal  Echocardiogram: not yet done  EKG/Telemetry: SR  LDL: 83  A1c: 4.9  Troponin: <0.015   Other testing: Not Applicable    Impression  Abnormal brain MRI and severe headache. Appearance of the abnormality on her brain MRI is not typical for embolic infarct due to location, however cannot dispute that this could be a small stroke. Fortunately she has recovered well.    Recommendations  - Await echo, if abnormal such as PFO, would get outpatient transcranial dopplers  - Await , CRP  - Continue aspirin 325mg daily  - Defer statin because she has no evidence of atherosclerosis on imaging  - Defer hypercoag panel testing until outpatient setting as it can be erroneous in the acute phase after a potential clot has happened  - Smoking cessation  - She can continue NSAIDs for any future migraine she gets. Recommend she NOT take any triptan meds    Patient Follow-up    - in the next 1 week(s) with PCP   - in 4-6 weeks with Dr. Mckinney at [facility] for stroke follow up [phone]  - in 6-8 weeks with any outpatient  Hematologist for hypercoaguable testing    Thank you for this consult.    Loretta Tomlin, " "[place]  Neurology  04/25/2020 12:03 PM  To page me or covering stroke neurology team member, click here: AMCOM  Choose \"On Call\" tab at top, then search dropdown box for \"Neurology Adult\" & press Enter, look for Neuro ICU/Stroke    _____________________________________________________    Past Medical History   Past Medical History:   Diagnosis Date     Migraine      Past Surgical History   No past surgical history on file.  Medications   Home Meds  Medication Sig   aspirin (ASA) 325 MG tablet Take 1 tablet (325 mg) by mouth daily for 14 days   gabapentin (NEURONTIN) 100 MG capsule Take 100 mg by mouth 3 times daily   naproxen sodium (ANAPROX) 220 MG tablet Take 440 mg by mouth every morning   sertraline (ZOLOFT) 50 MG tablet Take 50 mg by mouth daily       Scheduled Meds    aspirin  325 mg Oral Daily     nicotine  1 patch Transdermal Daily     nicotine   Transdermal Q8H     sertraline  50 mg Oral Daily     sodium chloride (PF)  3 mL Intracatheter Q8H       Infusion Meds    - MEDICATION INSTRUCTIONS -         PRN Meds  acetaminophen, bisacodyl, hydrALAZINE, hydrOXYzine, ibuprofen, labetalol, lidocaine 4%, lidocaine (buffered or not buffered), magnesium hydroxide, melatonin, naloxone, ondansetron **OR** ondansetron, oxyCODONE, - MEDICATION INSTRUCTIONS -, potassium chloride, potassium chloride with lidocaine, potassium chloride, potassium chloride, potassium chloride, prochlorperazine **OR** prochlorperazine **OR** prochlorperazine, senna-docusate **OR** senna-docusate, sodium chloride (PF)    Allergies   Allergies   Allergen Reactions     Sulfa Drugs Anaphylaxis     Family History   No family history on file.  Social History   Social History     Tobacco Use     Smoking status: Not on file   Substance Use Topics     Alcohol use: Not on file     Drug use: Not on file       Review of Systems   The 10 point Review of Systems is negative other than noted in the HPI or here.        PHYSICAL EXAMINATION   Temp:  [96.5  F " (35.8  C)-99.4  F (37.4  C)] 98.2  F (36.8  C)  Pulse:  [58-80] 63  Heart Rate:  [54-77] 54  Resp:  [10-24] 24  BP: ()/(59-95) 94/59  SpO2:  [91 %-100 %] 98 %    General:  patient lying in bed without any acute distress    HEENT:  normocephalic/atraumatic  Pulmonary:  no respiratory distress    Neurologic  Mental Status:  alert, oriented x 3, follows commands, speech clear and fluent, naming and repetition normal  Cranial Nerves:  visual fields intact (tested by nurse), EOMI with normal smooth pursuit, facial sensation intact and symmetric (tested by nurse), facial movements symmetric, hearing not formally tested but intact to conversation, no dysarthria, shoulder shrug equal bilaterally, tongue protrusion midline  Motor:  no abnormal movements, able to move all limbs antigravity spontaneously with no signs of hemiparesis observed, no pronator drift  Reflexes:  unable to test (telestroke)  Sensory:  light touch sensation intact and symmetric throughout upper and lower extremities (assessed by nurse), no extinction on double simultaneous stimulation (assessed by nurse)  Coordination:  normal finger-to-nose and heel-to-shin bilaterally without dysmetria, rapid alternating movements symmetric  Station/Gait:  unable to test due to telestroke      Dysphagia Screen  Per Nursing    Stroke Scales    NIHSS  Interval     Interval Comments     1a. Level of Consciousness 0-->Alert, keenly responsive   1b. LOC Questions 0-->Answers both questions correctly   1c. LOC Commands 0-->Performs both tasks correctly   2.   Best Gaze 0-->Normal   3.   Visual 0-->No visual loss   4.   Facial Palsy 0-->Normal symmetrical movements   5a. Motor Arm, Left 0-->No drift, limb holds 90 (or 45) degrees for full 10 secs   5b. Motor Arm, Right 0-->No drift, limb holds 90 (or 45) degrees for full 10 secs   6a. Motor Leg, Left 0-->No drift, leg holds 30 degree position for full 5 secs   6b. Motor Leg, right 0-->No drift, leg holds 30 degree  position for full 5 secs   7.   Limb Ataxia 0-->Absent   8.   Sensory 0-->Normal, no sensory loss   9.   Best Language 0-->No aphasia, normal   10. Dysarthria 0-->Normal   11. Extinction and Inattention  0-->No abnormality   Total 0 (04/24/20 1529)       Imaging  I personally reviewed all imaging; relevant findings per HPI.    Labs Data   CBC  Recent Labs   Lab 04/24/20  1434   WBC 8.9   RBC 4.63   HGB 14.3   HCT 42.9        Basic Metabolic Panel   Recent Labs   Lab 04/25/20  0859 04/25/20  0659 04/24/20  2339 04/24/20  1434   NA  --   --   --  141   POTASSIUM 4.2  --  3.3* 2.8*   CHLORIDE  --   --   --  108   CO2  --   --   --  27   BUN  --   --   --  11   CR  --   --   --  0.80   GLC  --  84 111* 94   THIAGO  --   --   --  8.6     Liver Panel  Recent Labs   Lab Test 04/24/20  1434   PROTTOTAL 7.5   ALBUMIN 4.1   BILITOTAL 0.9   ALKPHOS 84   AST 16   ALT 22     INR  Recent Labs   Lab Test 04/24/20  1434   INR 1.09      Lipid Profile  Recent Labs   Lab Test 04/25/20  0659   CHOL 121   HDL 29*   LDL 83   TRIG 47     A1C  Recent Labs   Lab Test 04/24/20  1434   A1C 4.9     Troponin I  Recent Labs   Lab 04/24/20  2339 04/24/20  1434   TROPI <0.015 <0.015          Stroke Code / Stroke Consult Data Data Telestroke Service Details  (for non-emergent stroke consult with tele)  Video start time 04/25/20   1129   Video end time 04/25/20   1202   Type of service telemedicine diagnostic assessment of acute neurological changes   Reason telemedicine is appropriate patient requires assessment with a specialist for diagnosis and treatment of neurological symptoms   Mode of transmission secure interactive audio and video communication per Flory   Originating site (patient location) [facility]    Distant site (provider location) Provider Home Office       I have personally spent a total of 50 minutes providing care and consulting with this patient's medical providers today, with more than 50% of this time spent  in consultation, coordination of care, and discussion with the patient and/or family regarding diagnostic results, prognosis, symptom management, risks and benefits of management options, and development of plan of care.

## 2020-04-25 NOTE — CONSULTS
Stroke Education Note    The following information has been reviewed with the patient:    1. Warning signs of stroke    2. Calling 911 if having warning signs of stroke    3. All modifiable risk factors: hypertension, CAD, atrial fib, diabetes, hypercholesterolemia, smoking, substance abuse, diet, physical inactivity, obesity, sleep apnea.    4. Patient's risk factors for stroke which include: Smoking and unhealthy diet    5. Follow-up plan for after discharge    6. Discharge medications which include: Aspirin    In addition, the [facility] Stroke Class Handout has been given to the patient.    Learner's response to risk factors / lifestyle modification education: Reasons to change     SHARI Duenas RN

## 2020-04-25 NOTE — PHARMACY-ADMISSION MEDICATION HISTORY
Admission medication history interview status for this patient is complete. See [facility] admission navigator for allergy information, prior to admission medications and immunization status.     Medication history interview done via telephone during Covid-19 pandemic, indicate source(s): Patient  Medication history resources (including written lists, pill bottles, clinic record):None  [facility]    Changes made to PTA medication list:  Added: sertraline, gabapentin  Deleted: -  Changed: -    Actions taken by pharmacist (provider contacted, etc):called pt for med rec     Additional medication history information:None    Medication reconciliation/reorder completed by provider prior to medication history?  no   (Y/N)     For patients on insulin therapy: no  (Y/N)  Sliding scale Novolog: -  Do you have a baseline novolog pre-meal dose:   __  units with meals or __ units/carb unit with meals  Do you eat three meals a day:  -  How many times do you check your blood glucose per day:  -  How many episodes of hypoglycemia do you have per week: -  Do you have a Continuous glucose monitor (CGM) : - (remind pt that not approved for hospital use)  Any specific barriers to therapy? -  (cost, comfortable with injections, confident with current diabetes regimen?)      Prior to Admission medications    Medication Sig Last Dose Taking? Auth Provider   aspirin (ASA) 325 MG tablet Take 1 tablet (325 mg) by mouth daily for 14 days  Yes Johnny March MD   gabapentin (NEURONTIN) 100 MG capsule Take 100 mg by mouth 3 times daily Past Week at Unknown time Yes Unknown, Entered By History   naproxen sodium (ANAPROX) 220 MG tablet Take 440 mg by mouth every morning 4/24/2020 at Unknown time Yes Unknown, Entered By History   sertraline (ZOLOFT) 50 MG tablet Take 50 mg by mouth daily Past Week at Unknown time Yes Unknown, Entered By History

## 2020-04-25 NOTE — PLAN OF CARE
Discharge Planner PT   Patient plan for discharge: home with family  Current status: Per OT higher level balance deficits possible due to tingling in LE, but otherwise inde with all mobility and stairs, no IP needs.   Barriers to return to prior living situation: none  Recommendations for discharge: Per OT home with OP PT, order placed  Rationale for recommendations: pt inde with all mobility but due to mild neuro deficits would benefit from OP PT. Order completed. No eval performed.        Entered by: Tatiana Astudillo 04/25/2020 12:45 PM

## 2020-04-25 NOTE — PROGRESS NOTES
"Reviewed echo result showing PFO, not entirely surprising given her possible stroke at her age and migraine history as migraines are associated with PFO    Her ROPE score is 7 indicating 72% change that stroke is due to PFO with a 6% risk of 2 year recurrence of stroke/TIA    In addition to the outpatient hematology consult, and neurology follow up, I also recommend outpatient TCD for HITS to be done by Dr. Travis Burden ([facility] [place] website says his clinic # is [phone]. She also needs a cardiology consult, and outpatient LEVON, to consider PFO closure if no other hypercoagulable state found by hematology.    Will d/w patient and RN. She also needs to stop smoking.      Loretta Tomlin PA-C  Neurology  04/25/2020 2:48 PM  To page me or covering stroke neurology team member, click here: AMCOM  Choose \"On Call\" tab at top, then search dropdown box for \"Neurology Adult\" & press Enter, look for Neuro ICU/Stroke    "

## 2020-04-25 NOTE — PLAN OF CARE
Discharge Planner OT   Patient plan for discharge: home     Current status: eval completed. Pt lives in town home with spouse and three children. Pt reports IND with all ADL/IADL's prior including driving. No AE at baseline.     Currently, pt reports visual deficits have mostly resolved and demonstrates full visual field. Pt reports mild LUE/LE numbness and incoordination however reports improvement from day prior. BUE strength 5/5 MMT, no ROM deficits. Cognition intact. Pt demonstrates ability to complete ADL's with IND- SBA. SBA for toilet transfer. IND for LE dressing. PT screen completed, pt ambulated x 250 ft in hallway with SBA, no LOB however pt reports uncomfortable sensation in LLE while weight bearing. Pt completed 1 flight of stairs with close SBA, no LOB noted. Encouraged pt to continue to ambulate with nursing staff during hospitalization.     Barriers to return to prior living situation: none from therapy stand point     Recommendations for discharge: home with family A for higher level IADL's initially - cooking, cleaning, laundry, driving. May consider OP PT for higher level balance.     Rationale for recommendations: pt reports deficits have nearly resolved with the exception of LUE/LE mild numbess/tingling. Pt demonstrates ability to complete ADL's and transfers with IND- SBA. May consider OP PT higher level balance training if pt feels sensation in LLE is impairing balance. Pt safe to discharge home with family A from therapy stand point.        Entered by: Avelina Serrano 04/25/2020 9:37 AM

## 2020-04-25 NOTE — PROGRESS NOTES
VSS. Neuro assessment unchanged - headache, slight left-sided weakness with numbness/tingling. Tolerating regular diet. Voiding. Tele-stroke/neurology and PT/OT consulted. Echo completed. BLE ultrasound needs to be completed. Tele - SR/SB. Will continue to monitor.

## 2020-04-26 VITALS
WEIGHT: 147 LBS | DIASTOLIC BLOOD PRESSURE: 75 MMHG | OXYGEN SATURATION: 99 % | BODY MASS INDEX: 20.58 KG/M2 | HEART RATE: 72 BPM | RESPIRATION RATE: 20 BRPM | HEIGHT: 71 IN | TEMPERATURE: 97.2 F | SYSTOLIC BLOOD PRESSURE: 109 MMHG

## 2020-04-26 PROCEDURE — 99239 HOSP IP/OBS DSCHRG MGMT >30: CPT | Performed by: HOSPITALIST

## 2020-04-26 PROCEDURE — 25000132 ZZH RX MED GY IP 250 OP 250 PS 637: Performed by: PHYSICIAN ASSISTANT

## 2020-04-26 RX ADMIN — HYDROXYZINE HYDROCHLORIDE 50 MG: 25 TABLET, FILM COATED ORAL at 00:26

## 2020-04-26 RX ADMIN — ASPIRIN 325 MG ORAL TABLET 325 MG: 325 PILL ORAL at 10:00

## 2020-04-26 ASSESSMENT — ACTIVITIES OF DAILY LIVING (ADL)
ADLS_ACUITY_SCORE: 10

## 2020-04-26 NOTE — CONSULTS
CM met with patient to discuss establishing with new PCP. Patient stated she has a PCP in [place]. Dr. Marcus Ngo with [facility] in [place]. She declined the need for any resources for a new PCP. No additional needs at this time.     CM will continue to follow patient until discharge for any additional needs.     Isela Fuller RN, BSN, CPHN, CM  Inpatient Care Coordination  [facility]  [phone]

## 2020-04-26 NOTE — PLAN OF CARE
Pertinent assessments:   AO X4. Soft Bps. Vision intact. Mild left sided weakness. Numbness/tingling of extremities. Atarax given for itching/sleep.     Major Shift Events:   U/s completed- negative for DVT. Atarax for itching, slept well    Treatment Plan:   Neuro consult, Echo in AM, K replacement, PT/OT      Discharge Readiness: Medically active  Expected Discharge Date: 4/26?  Discharge Disposition: Home with Homecare    Will need multiple outpatient follow up appointments, but patient feels capable of organizing those independently.

## 2020-04-26 NOTE — DISCHARGE SUMMARY
"  [facility]    Discharge Summary  Hospitalist    Date of Admission:  4/24/2020  Date of Discharge:  4/26/2020  Provider:  Ruddy Nelson MD  Date of Service (when I last saw the patient): 04/26/20    Discharge Diagnoses   1.  Presumptive right occipital lobe area of ischemia versus migraine sequel (left homonymous hemianopsia).  2.  History of migraines headache.   3. Tobacco use     Other medical issues:  Past Medical History:   Diagnosis Date     Migraine        History of Present Illness   Teri Call is an 34 year old female who presented with sharp headache, vision loss. Please see the admission history and physical for full details.    Hospital Course   Teri Call is a 34 year old female with tobacco abuse, migraines with aura, and history of palpitations and presents to [facility] on 4/24/2020 with acute onset sharp headache, vision loss, and left-sided weakness and was found to have a possible right occipital CVA.      # Presumptive right occipital lobe area of ischemia with left homonymous hemianopsia: CTP head showed a questionable perfusion abnormality R occipital lobe. MRI brain shows possible small subcortical infarct R occipital lobe, infarct vs migriane sequeal.  Neurology was consulted.  ECG showed sinus rhythm without acute ST segment changes.  Troponin negative x 2.  Lipid profile WNL.  TSH WNL.  Glucose was WNL.  COVID-19 was checked and negative.     ESR and CRP were within normal limits.  She was started on  recommended by Stroke Neuro. PT and OT did see the patient and did recommend outpatient PT for continued balance.    -TTE was performed and was \" strongly positive saline bubble study\" for a significant right to left interatrial shunt.  She will need an outpatient LEVON and cardiology follow-up to discuss possible closure.  Per neurology this should be done after she is seen by hematology in the outpatient setting.  -Given the positive TTE as above, bilateral " ultrasounds of the lower extremity done and negative.     -Continue  mg daily  -She will need to follow-up with her PCP within 1 week on discharge.    -She will need to follow-up with neurology within 4 to 6 weeks with Dr. Mckinney at [facility].    -She should see hematology in 6 to 8 weeks for hypercoagulable work-up.    -She will also need cardiology follow up for atrial defect.  Cardiac monitor ordered upon discharge given her sensation of heart racing/palpitations in the past.    -She will need outpatient Transcranial Doppler to be done by Dr. Travis Burden ([facility] [place] website says his clinic # is [phone].  -She was also instructed by my partner Dr Hernandez and by me to avoid triptan medications for migraine headache.     # Tobacco abuse  Strongly advised tobacco cessation.  Patient currently very motivated.    # History of migraine headaches.    # Discharge Pain Plan:    - Patient currently has NO PAIN and is not being prescribed pain medications on discharge.      Significant Results and Procedures    See bellow     Pending Results   Unresulted Labs Ordered in the Past 30 Days of this Admission     No orders found from 3/25/2020 to 4/25/2020.          Code Status   Full Code       Primary Care Physician   No primary care provider on file.    GEN:  Alert, oriented x 3, appears comfortable, NAD.  HEENT:  Normocephalic/atraumatic, no scleral icterus, no nasal discharge, mouth moist.  CV:  Regular rate and rhythm, no murmur or JVD.  S1 + S2 noted, no S3 or S4.  LUNGS:  Clear to auscultation bilaterally without rales/rhonchi/wheezing/retractions.  Symmetric chest rise on inhalation noted.  ABD:  Active bowel sounds, soft, non-tender/non-distended.  No rebound/guarding/rigidity.  EXT:  No edema or cyanosis.  No joint synovitis noted.  SKIN:  Dry to touch, no exanthems noted in the visualized areas.  NEURO: Normal motility and strength in the 4 limbs, No visual  deficit grossly in the bedside exam of visual fields.     Discharge Disposition   Discharged to home    Consultations This Hospital Stay   NEUROLOGY IP CONSULT  PHYSICAL THERAPY ADULT IP CONSULT  OCCUPATIONAL THERAPY ADULT IP CONSULT  PATIENT LEARNING CENTER IP CONSULT  CARE COORDINATOR IP CONSULT  SMOKING CESSATION PROGRAM IP CONSULT    Time Spent on this Encounter   Ruddy ODONNELL MD, personally saw the patient today and spent greater than 30 minutes discharging this patient.     Discharge Orders      PHYSICAL THERAPY REFERRAL      Cardiology Eval Adult Referral      Onc/Heme Adult Referral      Reason for your hospital stay    You were hospitalized for headache and left-sided weakness.  You had an MRI of the brain that showed a possible embolic stroke.  You were seen by stroke neurology.  They do recommend starting aspirin 325 mg daily.  Your blood sugar levels and your cholesterol were normal.  You will need to follow-up with your PCP within 1 week.  You will also need to follow-up with neurology in 4 to 6 weeks.  You should also see hematology in approximately 6 to 8 weeks for hypercoagulable work-up (pre-disposition to form blood clots)     Follow-up and recommended labs and tests     Follow up with primary care provider, No primary care provider on file., within 7 days for hospital follow- up.  No follow up labs or test are needed.    Follow-up with neurology in 4-6 weeks with Dr. Mckinney at [facility] for stroke follow up [phone]    Follow up in 6-8 weeks with any outpatient  Hematologist for hypercoaguable testing     Activity    Your activity upon discharge: activity as tolerated     Full Code     Cardiac Event Monitor Adult Pediatric     Diet    Follow this diet upon discharge: Orders Placed This Encounter      Regular Diet Adult     Discharge Medications   Current Discharge Medication List      START taking these medications    Details   aspirin (ASA) 325 MG tablet Take  1 tablet (325 mg) by mouth daily for 14 days  Qty: 14 tablet, Refills: 0         CONTINUE these medications which have NOT CHANGED    Details   gabapentin (NEURONTIN) 100 MG capsule Take 100 mg by mouth 3 times daily      naproxen sodium (ANAPROX) 220 MG tablet Take 440 mg by mouth every morning      sertraline (ZOLOFT) 50 MG tablet Take 50 mg by mouth daily           Allergies   Allergies   Allergen Reactions     Sulfa Drugs Anaphylaxis     Data   Most Recent 3 CBC's:  Recent Labs   Lab Test 04/24/20  1434   WBC 8.9   HGB 14.3   MCV 93         Most Recent 3 BMP's:  Recent Labs   Lab Test 04/25/20  0859 04/25/20  0659 04/24/20  2339 04/24/20  1434   NA  --   --   --  141   POTASSIUM 4.2  --  3.3* 2.8*   CHLORIDE  --   --   --  108   CO2  --   --   --  27   BUN  --   --   --  11   CR  --   --   --  0.80   ANIONGAP  --   --   --  6   THIAGO  --   --   --  8.6   GLC  --  84 111* 94     Most Recent 2 LFT's:  Recent Labs   Lab Test 04/24/20  1434   AST 16   ALT 22   ALKPHOS 84   BILITOTAL 0.9     Most Recent INR's and Anticoagulation Dosing History:  Anticoagulation Dose History     Recent Dosing and Labs Latest Ref Rng & Units 4/24/2020    INR 0.86 - 1.14 1.09        Most Recent 3 Troponin's:  Recent Labs   Lab Test 04/24/20  2339 04/24/20  1434   TROPI <0.015 <0.015     Most Recent Cholesterol Panel:  Recent Labs   Lab Test 04/25/20  0659   CHOL 121   LDL 83   HDL 29*   TRIG 47     Most Recent 6 Bacteria Isolates From Any Culture (See EPIC Reports for Culture Details):No lab results found.  Most Recent TSH, T4 and A1c Labs:  Recent Labs   Lab Test 04/24/20  1434   TSH 1.48   A1C 4.9     Results for orders placed or performed during the hospital encounter of 04/24/20   CTA Head Neck with Contrast    Narrative    CT ANGIOGRAM OF THE HEAD AND NECK WITH CONTRAST  CT HEAD PERFUSION WITH CONTRAST  4/24/2020 3:07 PM     HISTORY: Vision loss and headache.    TECHNIQUE:  CT angiography with an injection of 70mL Isovue-370  IV  with scans through the head and neck. Images were transferred to a  separate 3-D workstation where multiplanar reformations and 3-D images  were created. Estimates of carotid stenoses are made relative to the  distal internal carotid artery diameters except as noted. Radiation  dose for this scan was reduced using automated exposure control,  adjustment of the mA and/or kV according to patient size, or iterative  reconstruction technique.     Perfusion scans were performed with injection of additional IV  contrast. These images were processed on a separate 3-D workstation.     COMPARISON: None.     CT HEAD FINDINGS: No contrast enhancing lesions. CT perfusion images  demonstrate questionable abnormality in the right occipital lobe with  increase in time to drain and Tmax. No abnormality appreciated on mean  transit time in this area. No abnormality on the cerebral blood volume  or cerebral blood flow images.     CT ANGIOGRAM HEAD FINDINGS:  The major intracranial arteries including  the proximal branches of the anterior cerebral, middle cerebral, and  posterior cerebral arteries appear patent without vascular cutoff.  Presumed infundibulum projecting inferiorly from the left supraclinoid  ICA. No aneurysm is identified. No significant stenosis. Venous  circulation is unremarkable.     CT ANGIOGRAM NECK FINDINGS:   Normal origin of the great vessels from the aortic arch.     Right carotid artery: The right common and internal carotid arteries  are patent. No significant stenosis or atherosclerotic disease in the  carotid artery.     Left carotid artery: The left common and internal carotid arteries are  patent. No significant stenosis or atherosclerotic disease in the  carotid artery.     Vertebral arteries: Vertebral arteries are patent without evidence of  dissection. No significant stenosis.     Other findings: None.       Impression    IMPRESSION:   1. Patent arteries in the head and neck without vascular  cutoff. No  evidence of dissection. No aneurysm identified. No significant  stenosis.  2. CT perfusion images demonstrate questionable abnormality in the  right occipital lobe which may be artifactual. Given the history, this  could also represent an area of ischemia. This could be better  assessed with brain MR if the patient is able.    Results discussed with Johnny March at 3:09 PM on 4/24/2020.     MARI PARADA MD   CT Head Perfusion w Contrast    Narrative    CT ANGIOGRAM OF THE HEAD AND NECK WITH CONTRAST  CT HEAD PERFUSION WITH CONTRAST  4/24/2020 3:07 PM     HISTORY: Vision loss and headache.    TECHNIQUE:  CT angiography with an injection of 70mL Isovue-370 IV  with scans through the head and neck. Images were transferred to a  separate 3-D workstation where multiplanar reformations and 3-D images  were created. Estimates of carotid stenoses are made relative to the  distal internal carotid artery diameters except as noted. Radiation  dose for this scan was reduced using automated exposure control,  adjustment of the mA and/or kV according to patient size, or iterative  reconstruction technique.     Perfusion scans were performed with injection of additional IV  contrast. These images were processed on a separate 3-D workstation.     COMPARISON: None.     CT HEAD FINDINGS: No contrast enhancing lesions. CT perfusion images  demonstrate questionable abnormality in the right occipital lobe with  increase in time to drain and Tmax. No abnormality appreciated on mean  transit time in this area. No abnormality on the cerebral blood volume  or cerebral blood flow images.     CT ANGIOGRAM HEAD FINDINGS:  The major intracranial arteries including  the proximal branches of the anterior cerebral, middle cerebral, and  posterior cerebral arteries appear patent without vascular cutoff.  Presumed infundibulum projecting inferiorly from the left supraclinoid  ICA. No aneurysm is identified. No significant stenosis.  Venous  circulation is unremarkable.     CT ANGIOGRAM NECK FINDINGS:   Normal origin of the great vessels from the aortic arch.     Right carotid artery: The right common and internal carotid arteries  are patent. No significant stenosis or atherosclerotic disease in the  carotid artery.     Left carotid artery: The left common and internal carotid arteries are  patent. No significant stenosis or atherosclerotic disease in the  carotid artery.     Vertebral arteries: Vertebral arteries are patent without evidence of  dissection. No significant stenosis.     Other findings: None.       Impression    IMPRESSION:   1. Patent arteries in the head and neck without vascular cutoff. No  evidence of dissection. No aneurysm identified. No significant  stenosis.  2. CT perfusion images demonstrate questionable abnormality in the  right occipital lobe which may be artifactual. Given the history, this  could also represent an area of ischemia. This could be better  assessed with brain MR if the patient is able.    Results discussed with Johnny March at 3:09 PM on 4/24/2020.     MARI PARADA MD   CT Head w/o Contrast    Narrative    CT SCAN OF THE HEAD WITHOUT CONTRAST   4/24/2020 2:50 PM     HISTORY: Vision loss and headache.    TECHNIQUE:  Axial images of the head and coronal reformations without  IV contrast material. Radiation dose for this scan was reduced using  automated exposure control, adjustment of the mA and/or kV according  to patient size, or iterative reconstruction technique.    COMPARISON: None.    FINDINGS: There is no evidence of intracranial hemorrhage, mass, or  anomaly. The ventricles are normal in size, shape and configuration.  The brain parenchyma and subarachnoid spaces are normal.     The visualized portions of the sinuses and mastoids appear normal. The  bony calvarium and bones of the skull base appear intact.       Impression    IMPRESSION:   No evidence of acute intracranial hemorrhage, mass,  or  herniation.     MARI PARADA MD   MR Brain w/o & w Contrast    Narrative    MRI BRAIN WITHOUT AND WITH CONTRAST  4/24/2020 4:52 PM     HISTORY: Left-sided weakness. Transient left-sided vision loss.    TECHNIQUE: Multiplanar, multisequence MRI of the brain without and  with 6.5 mL Gadavist.    COMPARISON: Head CT from earlier the same day.     FINDINGS: Subtle area of restricted diffusion in the right occipital  lobe that appears to be located in the white matter (series 2 image  75). This corresponds to the area of questioned abnormality on the CT  perfusion images. No corresponding abnormality is seen on the T2  weighted sequence. No evidence of intracranial hemorrhage. No mass  effect or midline shift. No other parenchymal signal abnormality is  identified. Ventricular size is within normal limits without evidence  of hydrocephalus.    There is no abnormal intracranial enhancement.     The facial structures appear normal. The major arterial T2 flow voids  at the base of the brain appear patent.       Impression    IMPRESSION:    1. Subtle area of restricted diffusion in the right occipital lobe  that appears to be located in the white matter. No corresponding T2  signal abnormality or enhancement. This corresponds to the area of  abnormality on recent CT perfusion. This is concerning for an area of  ischemia. Other etiologies including sequela of migraine headaches  could be considered. Clinical correlation and close follow-up is  recommended.  2. No other intracranial abnormalities. Remainder of the brain is  relatively normal.      MARI PARADA MD   US Lower Extremity Venous Duplex Bilateral    Narrative    EXAM: US LOWER EXTREMITY VENOUS DUPLEX BILATERAL  LOCATION: [facility]  DATE/TIME: 4/25/2020 11:13 PM    INDICATION: PFO with stroke. Lightheadedness, tingling  COMPARISON: None.  TECHNIQUE: Venous Duplex ultrasound of bilateral lower extremities with and without compression, augmentation  and duplex. Color flow and spectral Doppler with waveform analysis performed.    FINDINGS: Exam includes the common femoral, femoral, popliteal veins as well as segmentally visualized deep calf veins and greater saphenous vein.     RIGHT: No deep vein thrombosis. No superficial thrombophlebitis. No popliteal cyst.    LEFT: No deep vein thrombosis. No superficial thrombophlebitis. No popliteal cyst.      Impression    IMPRESSION:  1.  No deep venous thrombosis in the bilateral lower extremities.   Echocardiogram Complete    Narrative    [id number]  [id number]  [id number]  [id number]^[place]^TEN^[facility]  Echocardiography Laboratory  [address] [place] [address]        Name: PANDA JOHN  MRN: [id number]  : 1985  Study Date: 2020 01:28 PM  Age: 34 yrs  Gender: Female  Patient Location: [facility]  Reason For Study: CVA  Ordering Physician: TEN TOLEDO  Performed By: Laura Whalen RDCS     BSA: 1.9 m2  Height: 71 in  Weight: 147 lb  HR: 58  BP: 123/84 mmHg  _____________________________________________________________________________  __        Procedure  Complete Portable Bubble Echo Adult.  _____________________________________________________________________________  __        Interpretation Summary     1. The atrial septum is aneurysmal with a strongly positive saline bubble  study suggestive of a significant right to left inter-atrial shunt.  2. Recommend transesophageal echocardiogram to rule out atrial septal defect.  3. Normal atrial size.  4. Normal left ventricular size and systolic function. Estimated LVEF 60-65%.  5. Normal right ventricular size and systolic function.  6. No significant cardiac valve disease.     There is no comparison study available.  _____________________________________________________________________________  __        Left Ventricle  The left ventricle is normal in size. There is normal left ventricular wall  thickness. Left  ventricular systolic function is normal. The visual ejection  fraction is estimated at 60-65%. Left ventricular diastolic function is  normal. No regional wall motion abnormalities noted.     Right Ventricle  The right ventricle is normal in size and function.     Atria  Normal left atrial size. Right atrial size is normal. The atrial septum is  aneurysmal. A contrast injection (Bubble Study) was performed that was  severely positive for flow across the interatrial septum.     Mitral Valve  The mitral valve leaflets appear normal. There is no evidence of stenosis,  fluttering, or prolapse. There is trace mitral regurgitation.        Tricuspid Valve  Normal tricuspid valve. There is trace tricuspid regurgitation.     Aortic Valve  The aortic valve is trileaflet. There is trace aortic regurgitation. No  hemodynamically significant valvular aortic stenosis.     Pulmonic Valve  The pulmonic valve is not well visualized. There is trace pulmonic valvular  regurgitation.     Vessels  The aortic root is normal size. Normal size ascending aorta. The inferior vena  cava is normal.     Pericardium  There is no pericardial effusion.        Rhythm  Sinus rhythm was noted.  _____________________________________________________________________________  __  MMode/2D Measurements & Calculations     IVSd: 0.82 cm  LVIDd: 4.1 cm  LVIDs: 2.9 cm  LVPWd: 0.91 cm  FS: 29.1 %  LV mass(C)d: 109.1 grams  LV mass(C)dI: 59.0 grams/m2  Ao root diam: 3.0 cm  LA dimension: 3.0 cm  asc Aorta Diam: 3.2 cm  LA/Ao: 0.98  LVOT diam: 2.0 cm  LVOT area: 3.1 cm2  LA Volume (BP): 39.0 ml  LA Volume Index (BP): 21.1 ml/m2  RWT: 0.44           Doppler Measurements & Calculations  MV E max hector: 88.1 cm/sec  MV A max hector: 68.2 cm/sec  MV E/A: 1.3  MV dec time: 0.18 sec  Ao V2 max: 137.1 cm/sec  Ao max P.0 mmHg  PA acc time: 0.19 sec  E/E' av.8  Lateral E/e': 5.7  Medial E/e': 7.9            _____________________________________________________________________________  __           Report approved by: Dr Desirae Souza 04/25/2020 02:37 PM        Disclaimer: This note consists of symbols derived from keyboarding, dictation and/or voice recognition software. As a result, there may be errors in the script that have gone undetected. Please consider this when interpreting information found in this chart.

## 2020-04-27 ENCOUNTER — NURSE TRIAGE (OUTPATIENT)
Dept: NURSING | Facility: CLINIC | Age: 35
End: 2020-04-27

## 2020-04-27 ENCOUNTER — HOSPITAL ENCOUNTER (OUTPATIENT)
Dept: CARDIOLOGY | Facility: CLINIC | Age: 35
Discharge: HOME | End: 2020-04-27
Attending: HOSPITALIST | Admitting: HOSPITALIST
Payer: COMMERCIAL

## 2020-04-27 DIAGNOSIS — I63.9 ACUTE CVA (CEREBROVASCULAR ACCIDENT) (H): ICD-10-CM

## 2020-04-27 PROCEDURE — 93270 REMOTE 30 DAY ECG REV/REPORT: CPT

## 2020-04-28 NOTE — TELEPHONE ENCOUNTER
"She was on the couch and she had a sharp pain that was severe in the back of her head, she felt a burning pain on the right side of her head and she was sweating and she couldn't stand. She is calling on her way to the hospital. Now she feels much better, and wants to know if she should go back in. She was in over the weekend and had a stroke.  I told her yes to go in to be evaluated if those are the same symptoms that made you go in over the weekend. She said she would go in.    Brea Ralph RN/ [facility]        Reason for Disposition    [1] SEVERE headache AND [2] sudden-onset (i.e., reaching maximum intensity within seconds)    Additional Information    Negative: Difficult to awaken or acting confused (e.g., disoriented, slurred speech)    Negative: [1] Weakness of the face, arm or leg on one side of the body AND [2] new onset    Negative: [1] Numbness of the face, arm or leg on one side of the body AND [2] new onset    Negative: [1] Loss of speech or garbled speech AND [2] new onset    Negative: Passed out (i.e., lost consciousness, collapsed and was not responding)    Negative: Sounds like a life-threatening emergency to the triager    Negative: [1] SEVERE headache (e.g., excruciating) AND [2] \"worst headache\" of life    Negative: Unable to walk, or can only walk with assistance (e.g., requires support)    Negative: Stiff neck (can't touch chin to chest)    Negative: Severe pain in one eye    Negative: [1] Other family members (or roommates) with headaches AND [2] possibility of carbon monoxide exposure    Protocols used: HEADACHE-A-AH      "

## 2020-05-08 ENCOUNTER — TRANSFERRED RECORDS (OUTPATIENT)
Dept: HEALTH INFORMATION MANAGEMENT | Facility: CLINIC | Age: 35
End: 2020-05-08

## 2020-05-13 ENCOUNTER — PRE VISIT (OUTPATIENT)
Dept: CARDIOLOGY | Facility: CLINIC | Age: 35
End: 2020-05-13

## 2020-05-18 PROBLEM — Q21.12 PFO (PATENT FORAMEN OVALE): Status: ACTIVE | Noted: 2020-04-27

## 2020-05-21 ENCOUNTER — VIRTUAL VISIT (OUTPATIENT)
Dept: NEUROSURGERY | Facility: CLINIC | Age: 35
End: 2020-05-21
Attending: PSYCHIATRY & NEUROLOGY
Payer: COMMERCIAL

## 2020-05-21 DIAGNOSIS — I63.10 CEREBROVASCULAR ACCIDENT (CVA) DUE TO EMBOLISM OF PRECEREBRAL ARTERY (H): Primary | ICD-10-CM

## 2020-05-21 PROCEDURE — 99213 OFFICE O/P EST LOW 20 MIN: CPT | Mod: 95 | Performed by: PHYSICIAN ASSISTANT

## 2020-05-21 NOTE — LETTER
"    5/21/2020         RE: Teri Call  [address] [facility] [address]        Dear Colleague,    Thank you for referring your patient, Teri Call, to the [facility]UR[facility]. Please see a copy of my visit note below.      Teri Call is a 34 year old female who is being evaluated via a billable video visit.      The patient has been notified of following:     \"This video visit will be conducted via a call between you and your physician/provider. We have found that certain health care needs can be provided without the need for an in-person physical exam.  This service lets us provide the care you need with a video conversation.  If a prescription is necessary we can send it directly to your pharmacy.  If lab work is needed we can place an order for that and you can then stop by our lab to have the test done at a later time.    Video visits are billed at different rates depending on your insurance coverage.  Please reach out to your insurance provider with any questions.    If during the course of the call the physician/provider feels a video visit is not appropriate, you will not be charged for this service.\"    Patient has given verbal consent for Video visit? Yes      Video-Visit Details    Type of service:  Video Visit    Video Start Time: 1406  Video End Time: 1438]    Originating Location (pt. Location): Home    Distant Location (provider location):  Provider home office    Platform used for Video Visit: [employer]    __________________________________________________________      Chief Complaint: stroke    History of Present Illness: Teri Call is a 34 year old female presenting for follow-up for stroke.    She was hospitalized in late April 2020.  She presented with headache, vision changes, L hemiparesis and L visual field defect but improved significantly by the time of our exam and NIH was 0 so she was not treated with tPA.     Stroke Evaluation " "summarized:  MRI/Head CT: CTP head showed a questionable perfusion abnormality R occipital lobe. MRI brain shows possible small subcortical infarct R occipital lobe   Intracranial Vascular Imaging: CTA head normal  Cervical Carotid and Vertebral Artery Vascular Imaging: CTA neck normal  Echocardiogram: +PFO, ?ASD  EKG/Telemetry: SR  LDL: 83  A1c: 4.9  Troponin: <0.015   Other testing: Not Applicable    Since she got out of the hospital, she has had some occasional sharp severe but very brief occipital pain. She was started on  Citalopram 10mg once a day since 5/8. She was also started on Venlafaxine 37.5mg daily since 5/8. She takes Lorazepam 0.5mg as needed. She notes no lasting stroke deficits.    She has about a week left of her 30 day cardionet. She will see cardiology on 5/27. She has not set up hem/onc appointment yet.    Impression:  R occipital stroke, ESUS  Her ROPE score is 7 indicating 72% change that stroke is due to PFO with a 6% risk of 2 year recurrence of stroke/TIA    Plan:   - Make hem/onc follow up  - Cardiology f/u: consider PFO closure if hem/onc workup and cardionet unrevealing for an alternate cause of stroke  - Continue aspirin  - Goal normotension  - Follow up with us in 6-8 weeks      Stroke Education provided.  she will call us with any questions.  For any acute neurologic deficits she was advised to  go directly to the hospital rather than call the clinic.    Loretta Tomlin PA-C  Neurology  05/21/2020 2:03 PM  To page me or covering stroke neurology team member, click here: AMCOM  Choose \"On Call\" tab at top, then search dropdown box for \"Neurology Adult\" & press Enter, look for Neuro ICU/Stroke    ___________________________________________________________________    Past Medical History:   Diagnosis Date     Acute CVA (cerebrovascular accident) (H) 4/24/2020     Anxiety      Depression      Migraine      PFO (patent foramen ovale) 4/27/2020     Naa        Current Outpatient " Medications   Medication     gabapentin (NEURONTIN) 100 MG capsule     naproxen sodium (ANAPROX) 220 MG tablet     sertraline (ZOLOFT) 50 MG tablet     No current facility-administered medications for this visit.        Social History     Tobacco Use     Smoking status: Not on file   Substance Use Topics     Alcohol use: Not on file     Drug use: Not on file       No family history on file.    ROS: 10 point relevant ROS neg other than the symptoms noted above in the HPI.    Physical Exam    There were no vitals taken for this visit.    General:  no acute distress  HEENT:  normocephalic/atraumatic  Pulmonary:  no respiratory distress    Neurologic  Mental Status:  alert, oriented x 3, follows commands, speech clear and fluent, naming and repetition normal  Cranial Nerves:  EOMI with normal smooth pursuit, facial movements symmetric, hearing not formally tested but intact to conversation, no dysarthria, shoulder shrug equal bilaterally, tongue protrusion midline  Motor:  no abnormal movements, able to move all limbs antigravity spontaneously with no signs of hemiparesis observed, no pronator drift  Reflexes:  unable to test (telestroke)  Sensory:  unable to test (telestroke)  Coordination:  normal finger-to-nose and heel-to-shin bilaterally without dysmetria, rapid alternating movements symmetric  Station/Gait:  unable to test (telestroke)    Neuro Imaging: as per HPI    Laboratory:  INR:  Recent Labs   Lab Test 04/24/20  1434   INR 1.09        Lipid Profile:  Recent Labs   Lab Test 04/25/20  0659   CHOL 121   HDL 29*   LDL 83   TRIG 47       A1C:   Recent Labs   Lab Test 04/24/20  1434   A1C 4.9       Troponin I:   Recent Labs   Lab Test 04/24/20  2339 04/24/20  1434   TROPI <0.015 <0.015       Again, thank you for allowing me to participate in the care of your patient.        Sincerely,        Loretta Tomlin PA-C

## 2020-05-21 NOTE — PROGRESS NOTES
"  Teri Call is a 34 year old female who is being evaluated via a billable video visit.      The patient has been notified of following:     \"This video visit will be conducted via a call between you and your physician/provider. We have found that certain health care needs can be provided without the need for an in-person physical exam.  This service lets us provide the care you need with a video conversation.  If a prescription is necessary we can send it directly to your pharmacy.  If lab work is needed we can place an order for that and you can then stop by our lab to have the test done at a later time.    Video visits are billed at different rates depending on your insurance coverage.  Please reach out to your insurance provider with any questions.    If during the course of the call the physician/provider feels a video visit is not appropriate, you will not be charged for this service.\"    Patient has given verbal consent for Video visit? Yes      Video-Visit Details    Type of service:  Video Visit    Video Start Time: 1406  Video End Time: 1438]    Originating Location (pt. Location): Home    Distant Location (provider location):  Provider home office    Platform used for Video Visit: [employer]    __________________________________________________________      Chief Complaint: stroke    History of Present Illness: Teri Call is a 34 year old female presenting for follow-up for stroke.    She was hospitalized in late April 2020.  She presented with headache, vision changes, L hemiparesis and L visual field defect but improved significantly by the time of our exam and NIH was 0 so she was not treated with tPA.     Stroke Evaluation summarized:  MRI/Head CT: CTP head showed a questionable perfusion abnormality R occipital lobe. MRI brain shows possible small subcortical infarct R occipital lobe   Intracranial Vascular Imaging: CTA head normal  Cervical Carotid and Vertebral Artery Vascular Imaging: CTA " "neck normal  Echocardiogram: +PFO, ?ASD  EKG/Telemetry: SR  LDL: 83  A1c: 4.9  Troponin: <0.015   Other testing: Not Applicable    Since she got out of the hospital, she has had some occasional sharp severe but very brief occipital pain. She was started on  Citalopram 10mg once a day since 5/8. She was also started on Venlafaxine 37.5mg daily since 5/8. She takes Lorazepam 0.5mg as needed. She notes no lasting stroke deficits.    She has about a week left of her 30 day cardionet. She will see cardiology on 5/27. She has not set up hem/onc appointment yet.    Impression:  R occipital stroke, ESUS  Her ROPE score is 7 indicating 72% change that stroke is due to PFO with a 6% risk of 2 year recurrence of stroke/TIA    Plan:   - Make hem/onc follow up  - Cardiology f/u: consider PFO closure if hem/onc workup and cardionet unrevealing for an alternate cause of stroke  - Continue aspirin  - Goal normotension  - Follow up with us in 6-8 weeks      Stroke Education provided.  she will call us with any questions.  For any acute neurologic deficits she was advised to  go directly to the hospital rather than call the clinic.    Loretta Tomlin PA-C  Neurology  05/21/2020 2:03 PM  To page me or covering stroke neurology team member, click here: AMCOM  Choose \"On Call\" tab at top, then search dropdown box for \"Neurology Adult\" & press Enter, look for Neuro ICU/Stroke    ___________________________________________________________________    Past Medical History:   Diagnosis Date     Acute CVA (cerebrovascular accident) (H) 4/24/2020     Anxiety      Depression      Migraine      PFO (patent foramen ovale) 4/27/2020     Pyelonephritis        Current Outpatient Medications   Medication     gabapentin (NEURONTIN) 100 MG capsule     naproxen sodium (ANAPROX) 220 MG tablet     sertraline (ZOLOFT) 50 MG tablet     No current facility-administered medications for this visit.        Social History     Tobacco Use     Smoking status: Not " on file   Substance Use Topics     Alcohol use: Not on file     Drug use: Not on file       No family history on file.    ROS: 10 point relevant ROS neg other than the symptoms noted above in the HPI.    Physical Exam    There were no vitals taken for this visit.    General:  no acute distress  HEENT:  normocephalic/atraumatic  Pulmonary:  no respiratory distress    Neurologic  Mental Status:  alert, oriented x 3, follows commands, speech clear and fluent, naming and repetition normal  Cranial Nerves:  EOMI with normal smooth pursuit, facial movements symmetric, hearing not formally tested but intact to conversation, no dysarthria, shoulder shrug equal bilaterally, tongue protrusion midline  Motor:  no abnormal movements, able to move all limbs antigravity spontaneously with no signs of hemiparesis observed, no pronator drift  Reflexes:  unable to test (telestroke)  Sensory:  unable to test (telestroke)  Coordination:  normal finger-to-nose and heel-to-shin bilaterally without dysmetria, rapid alternating movements symmetric  Station/Gait:  unable to test (telestroke)    Neuro Imaging: as per HPI    Laboratory:  INR:  Recent Labs   Lab Test 04/24/20  1434   INR 1.09        Lipid Profile:  Recent Labs   Lab Test 04/25/20  0659   CHOL 121   HDL 29*   LDL 83   TRIG 47       A1C:   Recent Labs   Lab Test 04/24/20  1434   A1C 4.9       Troponin I:   Recent Labs   Lab Test 04/24/20  2339 04/24/20  1434   TROPI <0.015 <0.015

## 2020-05-27 ENCOUNTER — VIRTUAL VISIT (OUTPATIENT)
Dept: CARDIOLOGY | Facility: CLINIC | Age: 35
End: 2020-05-27
Attending: HOSPITALIST
Payer: COMMERCIAL

## 2020-05-27 VITALS
WEIGHT: 147.4 LBS | DIASTOLIC BLOOD PRESSURE: 77 MMHG | BODY MASS INDEX: 20.64 KG/M2 | SYSTOLIC BLOOD PRESSURE: 116 MMHG | HEIGHT: 71 IN | HEART RATE: 65 BPM

## 2020-05-27 DIAGNOSIS — Q21.12 PFO (PATENT FORAMEN OVALE): ICD-10-CM

## 2020-05-27 DIAGNOSIS — I63.40 CEREBROVASCULAR ACCIDENT (CVA) DUE TO EMBOLISM OF CEREBRAL ARTERY (H): Primary | ICD-10-CM

## 2020-05-27 PROCEDURE — 99203 OFFICE O/P NEW LOW 30 MIN: CPT | Mod: 95 | Performed by: INTERNAL MEDICINE

## 2020-05-27 RX ORDER — CEFDINIR 300 MG/1
300 CAPSULE ORAL 2 TIMES DAILY
COMMUNITY
End: 2020-06-18

## 2020-05-27 RX ORDER — VENLAFAXINE 75 MG/1
75 TABLET ORAL DAILY
COMMUNITY
End: 2020-06-18

## 2020-05-27 ASSESSMENT — MIFFLIN-ST. JEOR: SCORE: 1459.73

## 2020-05-27 NOTE — PROGRESS NOTES
Service Date: 05/27/2020      HeyLets VIDEO VIRTUAL VISIT      HISTORY OF PRESENT ILLNESS:  This is a video conference visit with the patient, Ms. Call.  I believe her family member was in the background during the visit.  She is a 34-year-old woman who was referred in for discussion of a PFO.  On 04/2020, she presented with visual changes, headache and eventually, it cleared.  An MRI was done at [facility].  It showed a defect in the right occipital lobe, either due to ischemic disease or possible sequelae of migraine.  The patient does have a migraine headache history but was not having a migraine at that time.  She has no history of blood clot disorder.  She is on progesterone but not estrogen birth control pills and I had a discussion with her about talking to her OB/GYN which she had done already and they cleared her to continue.  Her mother may have had some type of blood clot but this patient was not sure and there is no clear family history of blood clot disorder.  The patient is an active smoker.  She denies any street drug use.  We, of course, talked about smoking cessation.  Her workup in the hospital included a negative DVT study in her lower extremities.  Carotid arteries and occipital arteries were fine.  The echo showed, by this report, aneurysmal septum was highly positive, right-to-left shunt across the atrial septum.  She has a minimally elevated LDL.  Today, I spent on the phone with her 20 minutes on the video conference but I spent an additional 10 minutes reviewing all the outside imaging studies that were done before I met her and also another 5-10 minutes to do scheduling.  So, the total office visit was 35-40 minutes.  I explained to her the embryology of the heart and how the fetal circulation is different than the adult circulation.  We explained how a paradoxical embolus could occur.  We talked about the incidence of PFO being approximately 15%.  We talked about the fact that  there is no way to prove that the stroke came from this but it is the leading theory in her case.  We talked about the [place] and the Amplatz device.  I went through how the procedure is done including general anesthesia, what our protocol is, overnight stay in the hospital, the fact that we need a transesophageal echo first.  She is going to schedule that with our office in the upcoming month.  We talked about potential for chipped tooth or esophageal tear being rare but possible with a transesophageal echo.  We quoted risk of the PFO closure procedure in the 1%-2% range including but not limited to bleeding, infection, cardiac perforation, device migration, death.  We talked about higher incidence of arrhythmia and ecchymosis.  We talked about the fact that it is general anesthesia.  She will stay overnight.  No driving a car or heavy lifting for 48 hours afterwards.  No contact sports for the first couple of weeks.  We talked about antibiotic prophylaxis and dental work, etc.  I answered all of her questions.  The next step will be a transesophageal echo.  I have ordered a preliminary hypercoagulable workup.  She did not make the appointment with the hematologist at the hospital as scheduled.  She was wearing an event monitor.  Those results are not yet back but there is no history of palpitations or atrial fibrillation.  After the transesophageal echo, we will review and see if there is indeed a PFO or if it is an ASD that is closable with percutaneous device versus surgery.  We talked about blood thinners and I also reviewed with her the PFO closure trials for her review at home.      Gomez Grodon MD       cc:   Marcus Ngo MD    [facility]    [address], [place] [address]         GOMEZ GORDON MD             D: 2020   T: 2020   MT: GAVIN      Name:     PANDA JOHN   MRN:      [id number]        Account:      [id number]   :      1985           Service Date:  05/27/2020      Document: [id number]

## 2020-05-27 NOTE — LETTER
5/27/2020    Marcus Ngo  [facility] [address] [facility] [address]    RE: Teri Call       Dear Colleague,    I had the pleasure of seeing Teri Call in the [facility].    HISTORY OF PRESENT ILLNESS:  This is a video conference visit with the patient, Ms. Call.  I believe her family member was in the background during the visit.  She is a 34-year-old woman who was referred in for discussion of a PFO.  On 04/2020, she presented with visual changes, headache and eventually, it cleared.  An MRI was done at [facility].  It showed a defect in the right occipital lobe, either due to ischemic disease or possible sequelae of migraine.  The patient does have a migraine headache history but was not having a migraine at that time.  She has no history of blood clot disorder.  She is on progesterone but not estrogen birth control pills and I had a discussion with her about talking to her OB/GYN which she had done already and they cleared her to continue.  Her mother may have had some type of blood clot but this patient was not sure and there is no clear family history of blood clot disorder.  The patient is an active smoker.  She denies any street drug use.  We, of course, talked about smoking cessation.  Her workup in the hospital included a negative DVT study in her lower extremities.  Carotid arteries and occipital arteries were fine.  The echo showed, by this report, aneurysmal septum was highly positive, right-to-left shunt across the atrial septum.  She has a minimally elevated LDL.  Today, I spent on the phone with her 20 minutes on the video conference but I spent an additional 10 minutes reviewing all the outside imaging studies that were done before I met her and also another 5-10 minutes to do scheduling.  So, the total office visit was 35-40 minutes.  I explained to her the embryology of the heart and how the fetal circulation is different than the  adult circulation.  We explained how a paradoxical embolus could occur.  We talked about the incidence of PFO being approximately 15%.  We talked about the fact that there is no way to prove that the stroke came from this but it is the leading theory in her case.  We talked about the [place] and the Amplatz device.  I went through how the procedure is done including general anesthesia, what our protocol is, overnight stay in the hospital, the fact that we need a transesophageal echo first.  She is going to schedule that with our office in the upcoming month.  We talked about potential for chipped tooth or esophageal tear being rare but possible with a transesophageal echo.  We quoted risk of the PFO closure procedure in the 1%-2% range including but not limited to bleeding, infection, cardiac perforation, device migration, death.  We talked about higher incidence of arrhythmia and ecchymosis.  We talked about the fact that it is general anesthesia.  She will stay overnight.  No driving a car or heavy lifting for 48 hours afterwards.  No contact sports for the first couple of weeks.  We talked about antibiotic prophylaxis and dental work, etc.  I answered all of her questions.  The next step will be a transesophageal echo.  I have ordered a preliminary hypercoagulable workup.  She did not make the appointment with the hematologist at the hospital as scheduled.  She was wearing an event monitor.  Those results are not yet back but there is no history of palpitations or atrial fibrillation.  After the transesophageal echo, we will review and see if there is indeed a PFO or if it is an ASD that is closable with percutaneous device versus surgery.  We talked about blood thinners and I also reviewed with her the PFO closure trials for her review at home.       Thank you for allowing me to participate in the care of your patient.    Sincerely,     Gomez Machado MD     [facility]    cc:    Morgan Hernandez MD  [address] [place] [address]

## 2020-05-27 NOTE — PROGRESS NOTES
"Review Of Systems  Skin:   Eyes:Ears/Nose/Throat:  Respiratory: NEGATIVE  Cardiovascular:*palpitations  Gastrointestinal: NEGATIVE  Genitourinary:NEGATIVE   Musculoskeletal: NEGATIVE  Neurologic: *migraines  Psychiatric: NEGATIVE  Hematologic/Lymphatic/Immunologic:   Endocrine:      Reviewed:  POLI Cabello  05/27/20    Teri Call is a 34 year old female who is being evaluated via a billable video visit.      The patient has been notified of following:     \"This video visit will be conducted via a call between you and your physician/provider. We have found that certain health care needs can be provided without the need for an in-person physical exam.  This service lets us provide the care you need with a video conversation.  If a prescription is necessary we can send it directly to your pharmacy.  If lab work is needed we can place an order for that and you can then stop by our lab to have the test done at a later time.    Video visits are billed at different rates depending on your insurance coverage.  Please reach out to your insurance provider with any questions.    If during the course of the call the physician/provider feels a video visit is not appropriate, you will not be charged for this service.\"    Patient has given verbal consent for Video visit? Yes  05/27/20  - Writer spoke w/pt via phone.  She is expecting the video visit w/Dr. Sage  - BP:  116/77  - P: 65  - Wt.:  147.0Lbs    POLI Cabello    How would you like to obtain your AVS? MyChart    Patient would like the video invitation sent by: Text to cell phone: [phone]    Will anyone else be joining your video visit? No          Video-Visit Details    Type of service:  Video Visit    Video Start Time: 150p  Video End Time:210p  Originating Location (pt. Location):home  Distant Location (provider location):  [facility]     Platform used for Video Visit:hiwot sage"

## 2020-05-29 DIAGNOSIS — Z11.59 ENCOUNTER FOR SCREENING FOR OTHER VIRAL DISEASES: Primary | ICD-10-CM

## 2020-06-04 ENCOUNTER — DOCUMENTATION ONLY (OUTPATIENT)
Dept: CARDIOLOGY | Facility: CLINIC | Age: 35
End: 2020-06-04

## 2020-06-04 ENCOUNTER — TELEPHONE (OUTPATIENT)
Dept: CARDIOLOGY | Facility: CLINIC | Age: 35
End: 2020-06-04

## 2020-06-04 NOTE — TELEPHONE ENCOUNTER
LEVON, reviewed meds,arrival, time of procedure , npo 6 hrs. Wear mask, have , no one can come in with Pt. ALTAGRACIA Hardin RN

## 2020-06-04 NOTE — PROGRESS NOTES

## 2020-06-05 DIAGNOSIS — Z11.59 ENCOUNTER FOR SCREENING FOR OTHER VIRAL DISEASES: ICD-10-CM

## 2020-06-05 PROCEDURE — 99207 ZZC NO BILLABLE SERVICE THIS VISIT: CPT

## 2020-06-05 PROCEDURE — U0003 INFECTIOUS AGENT DETECTION BY NUCLEIC ACID (DNA OR RNA); SEVERE ACUTE RESPIRATORY SYNDROME CORONAVIRUS 2 (SARS-COV-2) (CORONAVIRUS DISEASE [COVID-19]), AMPLIFIED PROBE TECHNIQUE, MAKING USE OF HIGH THROUGHPUT TECHNOLOGIES AS DESCRIBED BY CMS-2020-01-R: HCPCS | Mod: 90 | Performed by: INTERNAL MEDICINE

## 2020-06-05 PROCEDURE — 99000 SPECIMEN HANDLING OFFICE-LAB: CPT | Performed by: INTERNAL MEDICINE

## 2020-06-06 LAB
SARS-COV-2 RNA SPEC QL NAA+PROBE: NOT DETECTED
SPECIMEN SOURCE: NORMAL

## 2020-06-08 ENCOUNTER — DOCUMENTATION ONLY (OUTPATIENT)
Dept: CARDIOLOGY | Facility: CLINIC | Age: 35
End: 2020-06-08

## 2020-06-08 ENCOUNTER — HOSPITAL ENCOUNTER (OUTPATIENT)
Dept: CARDIOLOGY | Facility: CLINIC | Age: 35
End: 2020-06-08
Attending: INTERNAL MEDICINE | Admitting: INTERNAL MEDICINE
Payer: COMMERCIAL

## 2020-06-08 ENCOUNTER — HOSPITAL ENCOUNTER (OUTPATIENT)
Facility: CLINIC | Age: 35
Discharge: HOME | End: 2020-06-08
Attending: INTERNAL MEDICINE | Admitting: INTERNAL MEDICINE
Payer: COMMERCIAL

## 2020-06-08 VITALS
HEART RATE: 68 BPM | DIASTOLIC BLOOD PRESSURE: 71 MMHG | RESPIRATION RATE: 18 BRPM | OXYGEN SATURATION: 100 % | SYSTOLIC BLOOD PRESSURE: 105 MMHG

## 2020-06-08 DIAGNOSIS — I63.9 ACUTE CVA (CEREBROVASCULAR ACCIDENT) (H): ICD-10-CM

## 2020-06-08 DIAGNOSIS — Q21.12 PFO (PATENT FORAMEN OVALE): ICD-10-CM

## 2020-06-08 PROCEDURE — 81241 F5 GENE: CPT | Performed by: INTERNAL MEDICINE

## 2020-06-08 PROCEDURE — 25000128 H RX IP 250 OP 636

## 2020-06-08 PROCEDURE — 93325 DOPPLER ECHO COLOR FLOW MAPG: CPT | Mod: 26 | Performed by: INTERNAL MEDICINE

## 2020-06-08 PROCEDURE — 85307 ASSAY ACTIVATED PROTEIN C: CPT | Performed by: INTERNAL MEDICINE

## 2020-06-08 PROCEDURE — 93312 ECHO TRANSESOPHAGEAL: CPT | Mod: 26 | Performed by: INTERNAL MEDICINE

## 2020-06-08 PROCEDURE — 25000125 ZZHC RX 250: Performed by: INTERNAL MEDICINE

## 2020-06-08 PROCEDURE — 93312 ECHO TRANSESOPHAGEAL: CPT

## 2020-06-08 PROCEDURE — 81240 F2 GENE: CPT | Performed by: INTERNAL MEDICINE

## 2020-06-08 PROCEDURE — 85300 ANTITHROMBIN III ACTIVITY: CPT | Performed by: INTERNAL MEDICINE

## 2020-06-08 PROCEDURE — 93320 DOPPLER ECHO COMPLETE: CPT | Mod: 26 | Performed by: INTERNAL MEDICINE

## 2020-06-08 PROCEDURE — 25000132 ZZH RX MED GY IP 250 OP 250 PS 637: Performed by: INTERNAL MEDICINE

## 2020-06-08 PROCEDURE — 25000128 H RX IP 250 OP 636: Performed by: INTERNAL MEDICINE

## 2020-06-08 PROCEDURE — 25000125 ZZHC RX 250

## 2020-06-08 RX ORDER — LIDOCAINE HYDROCHLORIDE 20 MG/ML
SOLUTION OROPHARYNGEAL
Status: COMPLETED
Start: 2020-06-08 | End: 2020-06-08

## 2020-06-08 RX ORDER — SODIUM CHLORIDE 9 MG/ML
INJECTION, SOLUTION INTRAVENOUS CONTINUOUS PRN
Status: DISCONTINUED | OUTPATIENT
Start: 2020-06-08 | End: 2020-06-09 | Stop reason: HOSPADM

## 2020-06-08 RX ORDER — DEXTROSE MONOHYDRATE 25 G/50ML
9.5 INJECTION, SOLUTION INTRAVENOUS
Status: DISCONTINUED | OUTPATIENT
Start: 2020-06-08 | End: 2020-06-09 | Stop reason: HOSPADM

## 2020-06-08 RX ORDER — LIDOCAINE 40 MG/G
CREAM TOPICAL
Status: DISCONTINUED | OUTPATIENT
Start: 2020-06-08 | End: 2020-06-09 | Stop reason: HOSPADM

## 2020-06-08 RX ORDER — GLYCOPYRROLATE 0.2 MG/ML
0.1 INJECTION, SOLUTION INTRAMUSCULAR; INTRAVENOUS ONCE
Status: COMPLETED | OUTPATIENT
Start: 2020-06-08 | End: 2020-06-08

## 2020-06-08 RX ORDER — NALOXONE HYDROCHLORIDE 0.4 MG/ML
INJECTION, SOLUTION INTRAMUSCULAR; INTRAVENOUS; SUBCUTANEOUS
Status: DISCONTINUED
Start: 2020-06-08 | End: 2020-06-08 | Stop reason: WASHOUT

## 2020-06-08 RX ORDER — FENTANYL CITRATE 50 UG/ML
INJECTION, SOLUTION INTRAMUSCULAR; INTRAVENOUS
Status: COMPLETED
Start: 2020-06-08 | End: 2020-06-08

## 2020-06-08 RX ORDER — FLUMAZENIL 0.1 MG/ML
0.2 INJECTION, SOLUTION INTRAVENOUS
Status: DISCONTINUED | OUTPATIENT
Start: 2020-06-08 | End: 2020-06-09 | Stop reason: HOSPADM

## 2020-06-08 RX ORDER — FLUMAZENIL 0.1 MG/ML
INJECTION, SOLUTION INTRAVENOUS
Status: DISCONTINUED
Start: 2020-06-08 | End: 2020-06-08 | Stop reason: WASHOUT

## 2020-06-08 RX ORDER — FENTANYL CITRATE 50 UG/ML
50 INJECTION, SOLUTION INTRAMUSCULAR; INTRAVENOUS ONCE
Status: COMPLETED | OUTPATIENT
Start: 2020-06-08 | End: 2020-06-08

## 2020-06-08 RX ORDER — LIDOCAINE HYDROCHLORIDE 40 MG/ML
1.5 SOLUTION TOPICAL ONCE
Status: DISCONTINUED | OUTPATIENT
Start: 2020-06-08 | End: 2020-06-09 | Stop reason: HOSPADM

## 2020-06-08 RX ORDER — GLYCOPYRROLATE 0.2 MG/ML
INJECTION INTRAMUSCULAR; INTRAVENOUS
Status: DISPENSED
Start: 2020-06-08 | End: 2020-06-08

## 2020-06-08 RX ORDER — FENTANYL CITRATE 50 UG/ML
25 INJECTION, SOLUTION INTRAMUSCULAR; INTRAVENOUS
Status: DISCONTINUED | OUTPATIENT
Start: 2020-06-08 | End: 2020-06-09 | Stop reason: HOSPADM

## 2020-06-08 RX ORDER — LIDOCAINE 50 MG/G
OINTMENT TOPICAL ONCE
Status: DISCONTINUED | OUTPATIENT
Start: 2020-06-08 | End: 2020-06-09 | Stop reason: HOSPADM

## 2020-06-08 RX ORDER — DIPHENHYDRAMINE HCL 25 MG
25 CAPSULE ORAL EVERY 6 HOURS PRN
Status: DISCONTINUED | OUTPATIENT
Start: 2020-06-08 | End: 2020-06-09 | Stop reason: HOSPADM

## 2020-06-08 RX ORDER — NALOXONE HYDROCHLORIDE 0.4 MG/ML
.1-.4 INJECTION, SOLUTION INTRAMUSCULAR; INTRAVENOUS; SUBCUTANEOUS
Status: DISCONTINUED | OUTPATIENT
Start: 2020-06-08 | End: 2020-06-09 | Stop reason: HOSPADM

## 2020-06-08 RX ADMIN — FENTANYL CITRATE 25 MCG: 50 INJECTION, SOLUTION INTRAMUSCULAR; INTRAVENOUS at 12:00

## 2020-06-08 RX ADMIN — MIDAZOLAM 1 MG: 1 INJECTION INTRAMUSCULAR; INTRAVENOUS at 11:56

## 2020-06-08 RX ADMIN — FENTANYL CITRATE 25 MCG: 50 INJECTION, SOLUTION INTRAMUSCULAR; INTRAVENOUS at 12:04

## 2020-06-08 RX ADMIN — MIDAZOLAM 1 MG: 1 INJECTION INTRAMUSCULAR; INTRAVENOUS at 12:04

## 2020-06-08 RX ADMIN — FENTANYL CITRATE 25 MCG: 50 INJECTION, SOLUTION INTRAMUSCULAR; INTRAVENOUS at 11:55

## 2020-06-08 RX ADMIN — LIDOCAINE HYDROCHLORIDE 30 ML: 20 SOLUTION ORAL; TOPICAL at 11:22

## 2020-06-08 RX ADMIN — GLYCOPYRROLATE 0.1 MG: 0.2 INJECTION, SOLUTION INTRAMUSCULAR; INTRAVENOUS at 11:20

## 2020-06-08 RX ADMIN — MIDAZOLAM 1 MG: 1 INJECTION INTRAMUSCULAR; INTRAVENOUS at 12:05

## 2020-06-08 RX ADMIN — FENTANYL CITRATE 25 MCG: 50 INJECTION, SOLUTION INTRAMUSCULAR; INTRAVENOUS at 11:53

## 2020-06-08 RX ADMIN — FENTANYL CITRATE 25 MCG: 50 INJECTION, SOLUTION INTRAMUSCULAR; INTRAVENOUS at 12:05

## 2020-06-08 RX ADMIN — MIDAZOLAM 1 MG: 1 INJECTION INTRAMUSCULAR; INTRAVENOUS at 11:58

## 2020-06-08 RX ADMIN — TOPICAL ANESTHETIC 1 ML: 200 SPRAY DENTAL; PERIODONTAL at 11:48

## 2020-06-08 RX ADMIN — FENTANYL CITRATE 25 MCG: 50 INJECTION, SOLUTION INTRAMUSCULAR; INTRAVENOUS at 11:58

## 2020-06-08 RX ADMIN — DIPHENHYDRAMINE HYDROCHLORIDE 25 MG: 25 CAPSULE ORAL at 13:17

## 2020-06-08 RX ADMIN — MIDAZOLAM 1 MG: 1 INJECTION INTRAMUSCULAR; INTRAVENOUS at 11:59

## 2020-06-08 RX ADMIN — MIDAZOLAM 1 MG: 1 INJECTION INTRAMUSCULAR; INTRAVENOUS at 11:52

## 2020-06-08 NOTE — PROGRESS NOTES
Post LEVON pt was awake and alert, C/O itching on chest, abdomen, neck and back. Areas were reddened not raised, pt stated she usually takes Benadryl for itching, Dr Devine at bedside and updated, order for one time oral Benadryl given, pt stated feeling a little better prior to discharge, VSS no visible rashes or raised areas pt denied any SOB or other discomfort, to home

## 2020-06-08 NOTE — PROGRESS NOTES
"Dr Machado did review LEVON.Per DR Machado: This is a \"windsock\" septum with ASD on images 46 and 58. It is fenestrated with either 2 ASD holes plus  PFO or 3 holes plus PFO.  The distance from PFO to the outer hole I can see is >10 mm. These don't do well with percutaneous closure due to windsock and usually there are more holes that we haven't seen. I have done multiple closure devices to cover multiple holes but with wind sock they don't do as well because there is poor tissue to grab and usually more holes. I Would be willing to try with multiple devices only if [facility] turns her down.  Per  Dr Machado  Will inform Pt and Neurology that surgical closure is recommended. Per DR MICHAELLE Machado/ ALTAGRACIA Hardin RN  "

## 2020-06-08 NOTE — PRE-PROCEDURE
GENERAL PRE-PROCEDURE:   Procedure:  LEVON and genetic testing blood work  Date/Time:  6/8/2020 11:40 AM    Verbal consent obtained?: Yes    Written consent obtained?: Yes    Risks and benefits: Risks, benefits and alternatives were discussed    Consent given by:  Patient  Patient states understanding of procedure being performed: Yes    Patient's understanding of procedure matches consent: Yes    Procedure consent matches procedure scheduled: Yes    Expected level of sedation:  Moderate  Appropriately NPO:  Yes  ASA Class:  Class 2- mild systemic disease, no acute problems, no functional limitations  Mallampati  :  Grade 2- soft palate, base of uvula, tonsillar pillars, and portion of posterior pharyngeal wall visible  Lungs:  Lungs clear with good breath sounds bilaterally  Heart:  Normal heart sounds and rate  History & Physical reviewed:  History and physical reviewed and no updates needed  Statement of review:  I have reviewed the lab findings, diagnostic data, medications, and the plan for sedation

## 2020-06-08 NOTE — DISCHARGE INSTRUCTIONS
Taking Care of Yourself after LEVON     Patient's Name: Teri Call  Today's Date: June 8, 2020    You had a: LEVON   Your doctor was Dr. Devine    Activity and diet    Do not drive, drink alcohol or make major decisions for 24 hours. The medicines you received may make you dizzy, sleepy or forgetful.    An adult should stay with you for the first six hours at home. Take it easy for the rest of the day.    Return to your usual diet, but no scratchy foods for two days.    If your throat is sore, eat cold, bland or soft foods.    You may have heartburn if the tube used in the exam entered your stomach. If so:  - Do not eat acidic and spicy foods.  - Do not eat three hours before bedtime. Clear liquids are okay.  - When lying down, use two pillows to raise your head.  Medicines    You may start taking your usual medicines again. Always follow your doctor s orders.    You may take Tylenol (acetaminophen) if your throat is sore.    You may take antacids if you have heartburn. Take as directed.  Call your family doctor if you have any of these problems:    Heartburn that is severe or lasts more than 72 hours.    A sore throat that feels worse after 72 hours.    Shortness of breath, neck pain, chest pain, fever, chills, coughing up blood, or other unusual signs.    Call your doctor right a way if you have an irregular heartbeat, shortness of breath or feel dizzy.  Follow-up visits:  Keep all scheduled appointments

## 2020-06-09 ENCOUNTER — TELEPHONE (OUTPATIENT)
Dept: CARDIOLOGY | Facility: CLINIC | Age: 35
End: 2020-06-09

## 2020-06-09 LAB — AT III ACT/NOR PPP CHRO: 93 % (ref 85–135)

## 2020-06-09 NOTE — TELEPHONE ENCOUNTER
Spoke with Pt informed of findings on LEVON with more than one hole including PFO. Informed Pt per DR Machado will forward this information to Neurology DR Mckinney. Informed Pt that DR Machado would like Pt to have consult with [facility] also for closure options. Pt voiced concern would this be repairable. Informed her looking at all options for closure that are viable, and that DR Machado is willing to do a repair, but surgery is also an option. Asked Pt to please keep appointment with [facility] for further review of LEVON and discussion of options. ALTAGRACIA Hardin RN

## 2020-06-12 ENCOUNTER — VIRTUAL VISIT (OUTPATIENT)
Dept: CARDIOLOGY | Facility: CLINIC | Age: 35
End: 2020-06-12
Attending: INTERNAL MEDICINE
Payer: COMMERCIAL

## 2020-06-12 ENCOUNTER — DOCUMENTATION ONLY (OUTPATIENT)
Dept: CARDIOLOGY | Facility: CLINIC | Age: 35
End: 2020-06-12

## 2020-06-12 VITALS
SYSTOLIC BLOOD PRESSURE: 125 MMHG | DIASTOLIC BLOOD PRESSURE: 82 MMHG | HEART RATE: 62 BPM | WEIGHT: 147.6 LBS | BODY MASS INDEX: 20.59 KG/M2

## 2020-06-12 DIAGNOSIS — I63.9 ACUTE CVA (CEREBROVASCULAR ACCIDENT) (H): ICD-10-CM

## 2020-06-12 DIAGNOSIS — Q21.12 PFO (PATENT FORAMEN OVALE): Primary | ICD-10-CM

## 2020-06-12 LAB — APCR PPP: 3.1 {RATIO}

## 2020-06-12 PROCEDURE — 99214 OFFICE O/P EST MOD 30 MIN: CPT | Mod: 95 | Performed by: NURSE PRACTITIONER

## 2020-06-12 NOTE — PROGRESS NOTES
"Teri aCll is a 34 year old female who is being evaluated via a billable video visit.      The patient has been notified of following:     \"This video visit will be conducted via a call between you and your physician/provider. We have found that certain health care needs can be provided without the need for an in-person physical exam.  This service lets us provide the care you need with a video conversation.  If a prescription is necessary we can send it directly to your pharmacy.  If lab work is needed we can place an order for that and you can then stop by our lab to have the test done at a later time.    Video visits are billed at different rates depending on your insurance coverage.  Please reach out to your insurance provider with any questions.    If during the course of the call the physician/provider feels a video visit is not appropriate, you will not be charged for this service.\"    Patient has given verbal consent for Video visit? Yes    Text to [phone]    Will anyone else be joining your video visit? No       Patient reported vitals:  BP:125/82  Heart rate:62  Weight:147.6    Review Of Systems  Skin: negative  Eyes: negative  Ears/Nose/Throat: negative  Respiratory: No shortness of breath, dyspnea on exertion, cough, or hemoptysis  Cardiovascular: chest pain lightheaded fatigue  Gastrointestinal: negative  Genitourinary: nocturia  Musculoskeletal: joint pain  Neurologic: headaches left leg tingling  Psychiatric: sleep disturbance and anxiety  Hematologic/Lymphatic/Immunologic: negative  Endocrine: negative    SHowley CMA      Video-Visit Details    History of Present Illness:    Teri Call is a very pleasant 34-year-old female who saw Dr. Machado in consultation for possible percutaneous closure of a patent foramen ovale.  We are having a video visit today to follow-up on the results for a transesophageal echocardiogram.    In April of this year she presented with visual changes, " "headache, confusion which cleared.  She has some residual and tingling in her left leg.  An MRI was done at [facility] showing a defect in the right occipital lobe either due to ischemia disease or possible sequela of migraine.  She has a history of migraines but did not report a history of migraine typically at the time of her symptoms    She is on progesterone but not estrogen for birth control.  She talked to her OB/GYN and they have cleared her to continue this.  Reportedly her mother had a type of a blood clot but the patient is unsure about the details.  Work-up in the hospital also noted a negative DVT study in the lower extremities.    During her LEVON a partial hypercoagulable panel was performed.  The activated protein C was negative, Antithrombin III is negative, factor II and V are pending.    Today we have fully reviewed the results of her LEVON.  There is a \"windsock\" septum on images 46 and 58 of the LEVON.  This is fenestrated with either 2 ASD holes plus a PFO or 3 PFO holes.  The distance from the PFO to be outer hole is likely greater than 10 mm.  These typically do not do well with percutaneous closure due to the \"windsock\" appearance and usually there are more holes present than we actually see.  We have done closures using multiple devices in the past but with the \"windsock\" they do not do as well because there is poor tissue to grab onto and usually more holes present.  Dr. Machado would be willing to try with multiple devices but would like a Cardiovascular surgery opinion first.  If they turn her down for surgery, Dr. Machado will reconsider percutaneous closure.    I have called neurology today and talked with them.  They are on board with us referring her to Cardiovascular surgery and I did make arrangements for her to see Dr. Kaplan for more discussion.    Overall she is doing well.  The only complaint today is of tinnitus perhaps from her aspirin.  She states that all of this has made " "her very \"anxious and nervous\".  She is back to work 20 hours/week at [employer].  I spent a fair amount of time explaining  her defect, where it is located, the complexity of it and that it forms in utero.    Impression/Plan:    1.  History of a stroke with minimal residual.  She has a complex septal defect defect with multiple holes that either are combination of 2 ASD's and a PFO or 3 PFO holes.  There is a windsock appearance to the septum.  Dr. Solorzano feels she is a suboptimal candidate for percutaneous closure.  After consulting with Neurology we will refer her to the Cardiovascular surgery group for surgical opinion.  Only if she is turned down for surgery would we consider attempting percutaneous closure and again it would take multiple devices.  There is likely more holes present than we see.    Her ROPE score is 7 indicating a 72% chance that stroke is due to PFO with a 6% risk of 2 year recurrence of stroke/TA    In the meantime she will continue her antiplatelet therapy and follow-up with Neurology.  We will await Dr. Kaplan's opinion.  So far her hypercoagulable panel is negative.  Some results are pending.  I will message Dr. Solorzano to see if he would like a lupus anticoagulant and cardiolipin antibodies drawn and I will let the patient know.  She also wore a heart monitor for 30 days and has mailed that back in.  This was ordered by the [facility] providers and results are pending.  She denies any significant history of palpitations.    It has been a great pleasure meeting her in follow-up today.  Will await Dr. Kaplan's opinion    CURRENT MEDICATIONS:  Current Outpatient Medications   Medication Sig Dispense Refill     cefdinir (OMNICEF) 300 MG capsule Take 300 mg by mouth 2 times daily       gabapentin (NEURONTIN) 100 MG capsule Take 100 mg by mouth 3 times daily       naproxen sodium (ANAPROX) 220 MG tablet Take 440 mg by mouth every morning       sertraline (ZOLOFT) 50 MG tablet Take 50 mg by " mouth daily       venlafaxine (EFFEXOR) 75 MG tablet Take 75 mg by mouth daily         ALLERGIES     Allergies   Allergen Reactions     Ciprofloxacin Swelling     Sulfa Drugs Anaphylaxis       PAST MEDICAL HISTORY:  Past Medical History:   Diagnosis Date     Acute CVA (cerebrovascular accident) (H) 04/24/2020    ischemic vs migraine sequelae  R occipital lobe     Anxiety      Depression      Migraine      PFO (patent foramen ovale) 4/27/2020     Pyelonephritis        General Appearance:    no distress, normal body habitus, upright.    ENT/Mouth:    membranes moist, no nasal discharge or bleeding gums. Normal head shape, no evidence of injury or laceration.    EYES:    no scleral icterus, normal conjunctivae    Neck:    no evidence of thyromegaly.     Chest/Lungs:    No audible wheezing equal chest wall expansion. Non labored breathing. No cough.    Cardiovascular:    No evidence of elevated jugular venous pressure. No evidence of pitting edema bilaterally    Abdomen:    no evidence of abdominal distention. No observed jaundice.    Extremities:    no cyanosis or clubbing noted.    Skin:    no xanthelasma, normal skin collar. No evidence of facial lacerations.    Neurologic:    Normal arm motion bilateral, no tremors. No evidence of focal defect.    Psychiatric:    alert and oriented x3, calm      Type of service:  Video Visit    Video Start Time: 237pm  Video End Time: 312pm    Originating Location (pt. Location): Home    Distant Location (provider location)home office     Platform used for Video Visit: ELZA Castillo CNP

## 2020-06-12 NOTE — PATIENT INSTRUCTIONS
See Dr Kaplan from [facility]  I will have my office arrange  I will check and see if you need any other abnormal clotting labs done    If you have questions, you can reach me through my nurse Lili at [phone]    Talk to Neurology about your ringing in your ears and see if they would lower your aspirin dose  Bridegr

## 2020-06-12 NOTE — LETTER
"6/12/2020    Marcus Ngo  [facility] [address] [place] [facility] [address]    RE: Teri Call       Dear Colleague,    I had the pleasure of seeing Teri Call in the [facility].    Teri Call is a 34 year old female who is being evaluated via a billable video visit.        Teri Call is a very pleasant 34-year-old female who saw Dr. Machado in consultation for possible percutaneous closure of a patent foramen ovale.  We are having a video visit today to follow-up on the results for a transesophageal echocardiogram.    In April of this year she presented with visual changes, headache, confusion which cleared.  She has some residual and tingling in her left leg.  An MRI was done at [facility] showing a defect in the right occipital lobe either due to ischemia disease or possible sequela of migraine.  She has a history of migraines but did not report a history of migraine typically at the time of her symptoms    She is on progesterone but not estrogen for birth control.  She talked to her OB/GYN and they have cleared her to continue this.  Reportedly her mother had a type of a blood clot but the patient is unsure about the details.  Work-up in the hospital also noted a negative DVT study in the lower extremities.    During her LEVON a partial hypercoagulable panel was performed.  The activated protein C was negative, Antithrombin III is negative, factor II and V are pending.    Today we have fully reviewed the results of her LEVON.  There is a \"windsock\" septum on images 46 and 58 of the LEVON.  This is fenestrated with either 2 ASD holes plus a PFO or 3 PFO holes.  The distance from the PFO to be outer hole is likely greater than 10 mm.  These typically do not do well with percutaneous closure due to the \"windsock\" appearance and usually there are more holes present than we actually see.  We have done closures using multiple devices in the past but with the " "\"windsock\" they do not do as well because there is poor tissue to grab onto and usually more holes present.  Dr. Machado would be willing to try with multiple devices but would like a Cardiovascular surgery opinion first.  If they turn her down for surgery, Dr. Machado will reconsider percutaneous closure.    I have called neurology today and talked with them.  They are on board with us referring her to Cardiovascular surgery and I did make arrangements for her to see Dr. Kaplan for more discussion.    Overall she is doing well.  The only complaint today is of tinnitus perhaps from her aspirin.  She states that all of this has made her very \"anxious and nervous\".  She is back to work 20 hours/week at [employer].  I spent a fair amount of time explaining  her defect, where it is located, the complexity of it and that it forms in utero.    Impression/Plan:    1.  History of a stroke with minimal residual.  She has a complex septal defect defect with multiple holes that either are combination of 2 ASD's and a PFO or 3 PFO holes.  There is a windsock appearance to the septum.  Dr. Solorzano feels she is a suboptimal candidate for percutaneous closure.  After consulting with Neurology we will refer her to the Cardiovascular surgery group for surgical opinion.  Only if she is turned down for surgery would we consider attempting percutaneous closure and again it would take multiple devices.  There is likely more holes present than we see.    Her ROPE score is 7 indicating a 72% chance that stroke is due to PFO with a 6% risk of 2 year recurrence of stroke/TA    In the meantime she will continue her antiplatelet therapy and follow-up with Neurology.  We will await Dr. Kaplan's opinion.  So far her hypercoagulable panel is negative.  Some results are pending.  I will message Dr. Solorzano to see if he would like a lupus anticoagulant and cardiolipin antibodies drawn and I will let the patient know.  She also wore a heart " monitor for 30 days and has mailed that back in.  [facility] was [facility] and results are pending.  She denies any significant history of palpitations.    It has been a great pleasure meeting her in follow-up today.  Will await Dr. Kaplan's opinion    CURRENT MEDICATIONS:  Current Outpatient Medications   Medication Sig Dispense Refill     cefdinir (OMNICEF) 300 MG capsule Take 300 mg by mouth 2 times daily       gabapentin (NEURONTIN) 100 MG capsule Take 100 mg by mouth 3 times daily       naproxen sodium (ANAPROX) 220 MG tablet Take 440 mg by mouth every morning       sertraline (ZOLOFT) 50 MG tablet Take 50 mg by mouth daily       venlafaxine (EFFEXOR) 75 MG tablet Take 75 mg by mouth daily         ALLERGIES     Allergies   Allergen Reactions     Ciprofloxacin Swelling     Sulfa Drugs Anaphylaxis       PAST MEDICAL HISTORY:  Past Medical History:   Diagnosis Date     Acute CVA (cerebrovascular accident) (H) 04/24/2020    ischemic vs migraine sequelae  R occipital lobe     Anxiety      Depression      Migraine      PFO (patent foramen ovale) 4/27/2020     Pyelonephritis        General Appearance:    no distress, normal body habitus, upright.    ENT/Mouth:    membranes moist, no nasal discharge or bleeding gums. Normal head shape, no evidence of injury or laceration.    EYES:    no scleral icterus, normal conjunctivae    Neck:    no evidence of thyromegaly.     Chest/Lungs:    No audible wheezing equal chest wall expansion. Non labored breathing. No cough.    Cardiovascular:    No evidence of elevated jugular venous pressure. No evidence of pitting edema bilaterally    Abdomen:    no evidence of abdominal distention. No observed jaundice.    Extremities:    no cyanosis or clubbing noted.    Skin:    no xanthelasma, normal skin collar. No evidence of facial lacerations.    Neurologic:    Normal arm motion bilateral, no tremors. No evidence of focal defect.    Psychiatric:    alert and oriented  x3, calm      Thank you for allowing me to participate in the care of your patient.    Sincerely,     ELZA Kelley [facility]

## 2020-06-12 NOTE — PROGRESS NOTES
Had a licha conversation with her about surgical closure  And called neurology    She did not have complete hypercoag pane done at LEVON    No protein s  No cardiolipin   No lupus anticoagulant    Do you want me to arrange those    Sent her to [facility]

## 2020-06-15 LAB — COPATH REPORT: NORMAL

## 2020-06-16 DIAGNOSIS — Z11.59 ENCOUNTER FOR SCREENING FOR OTHER VIRAL DISEASES: Primary | ICD-10-CM

## 2020-06-16 DIAGNOSIS — Z11.59 ENCOUNTER FOR SCREENING FOR OTHER VIRAL DISEASES: ICD-10-CM

## 2020-06-16 PROCEDURE — U0003 INFECTIOUS AGENT DETECTION BY NUCLEIC ACID (DNA OR RNA); SEVERE ACUTE RESPIRATORY SYNDROME CORONAVIRUS 2 (SARS-COV-2) (CORONAVIRUS DISEASE [COVID-19]), AMPLIFIED PROBE TECHNIQUE, MAKING USE OF HIGH THROUGHPUT TECHNOLOGIES AS DESCRIBED BY CMS-2020-01-R: HCPCS | Mod: 90 | Performed by: SURGERY

## 2020-06-16 PROCEDURE — 99000 SPECIMEN HANDLING OFFICE-LAB: CPT | Performed by: SURGERY

## 2020-06-17 ENCOUNTER — HOSPITAL ENCOUNTER (OUTPATIENT)
Dept: GENERAL RADIOLOGY | Facility: CLINIC | Age: 35
End: 2020-06-17
Attending: SURGERY
Payer: COMMERCIAL

## 2020-06-17 ENCOUNTER — HOSPITAL ENCOUNTER (OUTPATIENT)
Dept: LAB | Facility: CLINIC | Age: 35
End: 2020-06-17
Attending: SURGERY
Payer: COMMERCIAL

## 2020-06-17 ENCOUNTER — DOCUMENTATION ONLY (OUTPATIENT)
Dept: OTHER | Facility: CLINIC | Age: 35
End: 2020-06-17

## 2020-06-17 ENCOUNTER — APPOINTMENT (OUTPATIENT)
Dept: CARDIOLOGY | Facility: CLINIC | Age: 35
End: 2020-06-17
Payer: COMMERCIAL

## 2020-06-17 DIAGNOSIS — Q21.12 PFO (PATENT FORAMEN OVALE): ICD-10-CM

## 2020-06-17 LAB
ALBUMIN SERPL-MCNC: 3.9 G/DL (ref 3.4–5)
ALBUMIN UR-MCNC: NEGATIVE MG/DL
ALP SERPL-CCNC: 80 U/L (ref 40–150)
ALT SERPL W P-5'-P-CCNC: 22 U/L (ref 0–50)
ANION GAP SERPL CALCULATED.3IONS-SCNC: 4 MMOL/L (ref 3–14)
APPEARANCE UR: CLEAR
AST SERPL W P-5'-P-CCNC: 16 U/L (ref 0–45)
BILIRUB SERPL-MCNC: 0.4 MG/DL (ref 0.2–1.3)
BILIRUB UR QL STRIP: NEGATIVE
BUN SERPL-MCNC: 10 MG/DL (ref 7–30)
CALCIUM SERPL-MCNC: 8.7 MG/DL (ref 8.5–10.1)
CHLORIDE SERPL-SCNC: 109 MMOL/L (ref 94–109)
CO2 SERPL-SCNC: 25 MMOL/L (ref 20–32)
COLOR UR AUTO: ABNORMAL
CREAT SERPL-MCNC: 0.79 MG/DL (ref 0.52–1.04)
ERYTHROCYTE [DISTWIDTH] IN BLOOD BY AUTOMATED COUNT: 12.7 % (ref 10–15)
GFR SERPL CREATININE-BSD FRML MDRD: >90 ML/MIN/{1.73_M2}
GLUCOSE SERPL-MCNC: 79 MG/DL (ref 70–99)
GLUCOSE UR STRIP-MCNC: NEGATIVE MG/DL
HBA1C MFR BLD: 5.2 % (ref 0–5.6)
HCT VFR BLD AUTO: 42.1 % (ref 35–47)
HGB BLD-MCNC: 14.3 G/DL (ref 11.7–15.7)
HGB UR QL STRIP: NEGATIVE
KETONES UR STRIP-MCNC: NEGATIVE MG/DL
LEUKOCYTE ESTERASE UR QL STRIP: NEGATIVE
MCH RBC QN AUTO: 30.9 PG (ref 26.5–33)
MCHC RBC AUTO-ENTMCNC: 34 G/DL (ref 31.5–36.5)
MCV RBC AUTO: 91 FL (ref 78–100)
NITRATE UR QL: NEGATIVE
PH UR STRIP: 6 PH (ref 5–7)
PLATELET # BLD AUTO: 266 10E9/L (ref 150–450)
POTASSIUM SERPL-SCNC: 3.2 MMOL/L (ref 3.4–5.3)
PROT SERPL-MCNC: 7.5 G/DL (ref 6.8–8.8)
RBC # BLD AUTO: 4.63 10E12/L (ref 3.8–5.2)
SARS-COV-2 RNA SPEC QL NAA+PROBE: NOT DETECTED
SODIUM SERPL-SCNC: 138 MMOL/L (ref 133–144)
SOURCE: ABNORMAL
SP GR UR STRIP: 1 (ref 1–1.03)
SPECIMEN SOURCE: NORMAL
UROBILINOGEN UR STRIP-MCNC: NORMAL MG/DL (ref 0–2)
WBC # BLD AUTO: 6.9 10E9/L (ref 4–11)

## 2020-06-17 PROCEDURE — 85027 COMPLETE CBC AUTOMATED: CPT | Performed by: SURGERY

## 2020-06-17 PROCEDURE — 83036 HEMOGLOBIN GLYCOSYLATED A1C: CPT | Performed by: SURGERY

## 2020-06-17 PROCEDURE — 81003 URINALYSIS AUTO W/O SCOPE: CPT | Performed by: SURGERY

## 2020-06-17 PROCEDURE — 86900 BLOOD TYPING SEROLOGIC ABO: CPT | Performed by: SURGERY

## 2020-06-17 PROCEDURE — 36415 COLL VENOUS BLD VENIPUNCTURE: CPT | Performed by: SURGERY

## 2020-06-17 PROCEDURE — 86850 RBC ANTIBODY SCREEN: CPT | Performed by: SURGERY

## 2020-06-17 PROCEDURE — 80053 COMPREHEN METABOLIC PANEL: CPT | Performed by: SURGERY

## 2020-06-17 PROCEDURE — 86901 BLOOD TYPING SEROLOGIC RH(D): CPT | Performed by: SURGERY

## 2020-06-17 PROCEDURE — 71046 X-RAY EXAM CHEST 2 VIEWS: CPT

## 2020-06-17 PROCEDURE — 86923 COMPATIBILITY TEST ELECTRIC: CPT | Performed by: SURGERY

## 2020-06-17 NOTE — PROGRESS NOTES
Met with patient in consultation with Dr. Kaplan. Pre op imaging and labs reviewed. Pre op education done. All questions addressed. Plan PFO closure 6/19.

## 2020-06-18 ENCOUNTER — ANESTHESIA EVENT (OUTPATIENT)
Dept: SURGERY | Facility: CLINIC | Age: 35
End: 2020-06-18
Payer: COMMERCIAL

## 2020-06-18 RX ORDER — LORAZEPAM 0.5 MG/1
0.5 TABLET ORAL DAILY PRN
COMMUNITY
End: 2024-08-22

## 2020-06-18 RX ORDER — VENLAFAXINE HYDROCHLORIDE 75 MG/1
75 CAPSULE, EXTENDED RELEASE ORAL 2 TIMES DAILY PRN
COMMUNITY
End: 2024-08-22

## 2020-06-18 ASSESSMENT — LIFESTYLE VARIABLES: TOBACCO_USE: 1

## 2020-06-19 ENCOUNTER — APPOINTMENT (OUTPATIENT)
Dept: CARDIOLOGY | Facility: CLINIC | Age: 35
End: 2020-06-19
Attending: SURGERY
Payer: COMMERCIAL

## 2020-06-19 ENCOUNTER — HOSPITAL ENCOUNTER (INPATIENT)
Facility: CLINIC | Age: 35
LOS: 4 days | Discharge: HOME | End: 2020-06-23
Attending: SURGERY | Admitting: SURGERY
Payer: COMMERCIAL

## 2020-06-19 ENCOUNTER — APPOINTMENT (OUTPATIENT)
Dept: GENERAL RADIOLOGY | Facility: CLINIC | Age: 35
End: 2020-06-19
Attending: PHYSICIAN ASSISTANT
Payer: COMMERCIAL

## 2020-06-19 ENCOUNTER — ANESTHESIA (OUTPATIENT)
Dept: SURGERY | Facility: CLINIC | Age: 35
End: 2020-06-19
Payer: COMMERCIAL

## 2020-06-19 ENCOUNTER — DOCUMENTATION ONLY (OUTPATIENT)
Dept: OTHER | Facility: CLINIC | Age: 35
End: 2020-06-19

## 2020-06-19 DIAGNOSIS — Q21.12 PATENT FORAMEN OVALE: ICD-10-CM

## 2020-06-19 DIAGNOSIS — Z87.74 S/P PATENT FORAMEN OVALE CLOSURE: Primary | ICD-10-CM

## 2020-06-19 DIAGNOSIS — Q21.12 PFO (PATENT FORAMEN OVALE): ICD-10-CM

## 2020-06-19 LAB
ABO + RH BLD: NORMAL
ABO + RH BLD: NORMAL
ALBUMIN SERPL-MCNC: 3 G/DL (ref 3.4–5)
ALP SERPL-CCNC: 56 U/L (ref 40–150)
ALT SERPL W P-5'-P-CCNC: 19 U/L (ref 0–50)
ANION GAP SERPL CALCULATED.3IONS-SCNC: 3 MMOL/L (ref 3–14)
ANION GAP SERPL CALCULATED.3IONS-SCNC: 6 MMOL/L (ref 3–14)
APTT PPP: 27 SEC (ref 22–37)
APTT PPP: 28 SEC (ref 22–37)
AST SERPL W P-5'-P-CCNC: 42 U/L (ref 0–45)
BASE DEFICIT BLDA-SCNC: 0.5 MMOL/L
BASE DEFICIT BLDA-SCNC: 1.1 MMOL/L
BASE DEFICIT BLDA-SCNC: 1.7 MMOL/L
BASE DEFICIT BLDA-SCNC: 2.1 MMOL/L
BASE DEFICIT BLDA-SCNC: 2.3 MMOL/L
BASE EXCESS BLDV CALC-SCNC: 1.2 MMOL/L
BILIRUB SERPL-MCNC: 0.7 MG/DL (ref 0.2–1.3)
BLD GP AB SCN SERPL QL: NORMAL
BLD PROD TYP BPU: NORMAL
BLD UNIT ID BPU: 0
BLD UNIT ID BPU: 0
BLOOD BANK CMNT PATIENT-IMP: NORMAL
BLOOD PRODUCT CODE: NORMAL
BLOOD PRODUCT CODE: NORMAL
BPU ID: NORMAL
BPU ID: NORMAL
BUN SERPL-MCNC: 4 MG/DL (ref 7–30)
BUN SERPL-MCNC: 4 MG/DL (ref 7–30)
CA-I BLD-MCNC: 3.7 MG/DL (ref 4.4–5.2)
CA-I BLD-MCNC: 3.9 MG/DL (ref 4.4–5.2)
CA-I BLD-MCNC: 4 MG/DL (ref 4.4–5.2)
CA-I BLD-MCNC: 4.6 MG/DL (ref 4.4–5.2)
CA-I BLD-MCNC: 4.8 MG/DL (ref 4.4–5.2)
CA-I BLD-MCNC: 4.9 MG/DL (ref 4.4–5.2)
CALCIUM SERPL-MCNC: 8.1 MG/DL (ref 8.5–10.1)
CALCIUM SERPL-MCNC: 8.3 MG/DL (ref 8.5–10.1)
CHLORIDE SERPL-SCNC: 110 MMOL/L (ref 94–109)
CHLORIDE SERPL-SCNC: 113 MMOL/L (ref 94–109)
CO2 SERPL-SCNC: 24 MMOL/L (ref 20–32)
CO2 SERPL-SCNC: 25 MMOL/L (ref 20–32)
CREAT SERPL-MCNC: 0.71 MG/DL (ref 0.52–1.04)
CREAT SERPL-MCNC: 0.78 MG/DL (ref 0.52–1.04)
ERYTHROCYTE [DISTWIDTH] IN BLOOD BY AUTOMATED COUNT: 12.7 % (ref 10–15)
ERYTHROCYTE [DISTWIDTH] IN BLOOD BY AUTOMATED COUNT: 12.7 % (ref 10–15)
FIBRINOGEN PPP-MCNC: 195 MG/DL (ref 200–420)
GFR SERPL CREATININE-BSD FRML MDRD: >90 ML/MIN/{1.73_M2}
GFR SERPL CREATININE-BSD FRML MDRD: >90 ML/MIN/{1.73_M2}
GLUCOSE BLDC GLUCOMTR-MCNC: 117 MG/DL (ref 70–99)
GLUCOSE BLDC GLUCOMTR-MCNC: 128 MG/DL (ref 70–99)
GLUCOSE BLDC GLUCOMTR-MCNC: 95 MG/DL (ref 70–99)
GLUCOSE SERPL-MCNC: 120 MG/DL (ref 70–99)
GLUCOSE SERPL-MCNC: 125 MG/DL (ref 70–99)
HCG UR QL: NEGATIVE
HCO3 BLD-SCNC: 24 MMOL/L (ref 21–28)
HCO3 BLD-SCNC: 25 MMOL/L (ref 21–28)
HCO3 BLD-SCNC: 25 MMOL/L (ref 21–28)
HCO3 BLDV-SCNC: 27 MMOL/L (ref 21–28)
HCT VFR BLD AUTO: 27.3 % (ref 35–47)
HCT VFR BLD AUTO: 33.6 % (ref 35–47)
HGB BLD-MCNC: 11.4 G/DL (ref 11.7–15.7)
HGB BLD-MCNC: 9.4 G/DL (ref 11.7–15.7)
INR PPP: 1.51 (ref 0.86–1.14)
INR PPP: 1.72 (ref 0.86–1.14)
LACTATE BLD-SCNC: 0.8 MMOL/L (ref 0.7–2)
LACTATE BLD-SCNC: 0.9 MMOL/L (ref 0.7–2)
LACTATE BLD-SCNC: 0.9 MMOL/L (ref 0.7–2)
LACTATE BLD-SCNC: 1.1 MMOL/L (ref 0.7–2)
LACTATE BLD-SCNC: 1.1 MMOL/L (ref 0.7–2)
LACTATE BLD-SCNC: 1.3 MMOL/L (ref 0.7–2)
MAGNESIUM SERPL-MCNC: 3.1 MG/DL (ref 1.6–2.3)
MCH RBC QN AUTO: 31 PG (ref 26.5–33)
MCH RBC QN AUTO: 31.5 PG (ref 26.5–33)
MCHC RBC AUTO-ENTMCNC: 33.9 G/DL (ref 31.5–36.5)
MCHC RBC AUTO-ENTMCNC: 34.4 G/DL (ref 31.5–36.5)
MCV RBC AUTO: 91 FL (ref 78–100)
MCV RBC AUTO: 92 FL (ref 78–100)
MRSA DNA SPEC QL NAA+PROBE: NEGATIVE
NUM BPU REQUESTED: 4
O2/TOTAL GAS SETTING VFR VENT: 100 %
O2/TOTAL GAS SETTING VFR VENT: 70 %
O2/TOTAL GAS SETTING VFR VENT: 80 %
O2/TOTAL GAS SETTING VFR VENT: ABNORMAL %
OXYHGB MFR BLD: 100 % (ref 92–100)
OXYHGB MFR BLD: 98 % (ref 92–100)
OXYHGB MFR BLD: >100 % (ref 92–100)
OXYHGB MFR BLDV: 93 %
PCO2 BLD: 36 MM HG (ref 35–45)
PCO2 BLD: 39 MM HG (ref 35–45)
PCO2 BLD: 48 MM HG (ref 35–45)
PCO2 BLD: 48 MM HG (ref 35–45)
PCO2 BLD: 52 MM HG (ref 35–45)
PCO2 BLDV: 45 MM HG (ref 40–50)
PH BLD: 7.29 PH (ref 7.35–7.45)
PH BLD: 7.31 PH (ref 7.35–7.45)
PH BLD: 7.32 PH (ref 7.35–7.45)
PH BLD: 7.39 PH (ref 7.35–7.45)
PH BLD: 7.43 PH (ref 7.35–7.45)
PH BLDV: 7.38 PH (ref 7.32–7.43)
PHOSPHATE SERPL-MCNC: 3.1 MG/DL (ref 2.5–4.5)
PLATELET # BLD AUTO: 157 10E9/L (ref 150–450)
PLATELET # BLD AUTO: 164 10E9/L (ref 150–450)
PO2 BLD: 175 MM HG (ref 80–105)
PO2 BLD: 225 MM HG (ref 80–105)
PO2 BLD: 388 MM HG (ref 80–105)
PO2 BLD: 440 MM HG (ref 80–105)
PO2 BLD: 533 MM HG (ref 80–105)
PO2 BLDV: 60 MM HG (ref 25–47)
POTASSIUM SERPL-SCNC: 3.5 MMOL/L (ref 3.4–5.3)
POTASSIUM SERPL-SCNC: 3.6 MMOL/L (ref 3.4–5.3)
POTASSIUM SERPL-SCNC: 3.8 MMOL/L (ref 3.4–5.3)
PROT SERPL-MCNC: 5 G/DL (ref 6.8–8.8)
RBC # BLD AUTO: 2.98 10E12/L (ref 3.8–5.2)
RBC # BLD AUTO: 3.68 10E12/L (ref 3.8–5.2)
SODIUM SERPL-SCNC: 140 MMOL/L (ref 133–144)
SODIUM SERPL-SCNC: 141 MMOL/L (ref 133–144)
SPECIMEN EXP DATE BLD: NORMAL
SPECIMEN SOURCE: NORMAL
TRANSFUSION STATUS PATIENT QL: NORMAL
WBC # BLD AUTO: 18.1 10E9/L (ref 4–11)
WBC # BLD AUTO: 19.1 10E9/L (ref 4–11)

## 2020-06-19 PROCEDURE — 36000075 ZZH SURGERY LEVEL 6 EA 15 ADDTL MIN: Performed by: SURGERY

## 2020-06-19 PROCEDURE — 25000565 ZZH ISOFLURANE, EA 15 MIN: Performed by: SURGERY

## 2020-06-19 PROCEDURE — 93005 ELECTROCARDIOGRAM TRACING: CPT

## 2020-06-19 PROCEDURE — 84132 ASSAY OF SERUM POTASSIUM: CPT | Performed by: ANESTHESIOLOGY

## 2020-06-19 PROCEDURE — 25000125 ZZHC RX 250: Performed by: NURSE ANESTHETIST, CERTIFIED REGISTERED

## 2020-06-19 PROCEDURE — 93321 DOPPLER ECHO F-UP/LMTD STD: CPT | Mod: 26 | Performed by: INTERNAL MEDICINE

## 2020-06-19 PROCEDURE — 93325 DOPPLER ECHO COLOR FLOW MAPG: CPT | Mod: 26 | Performed by: INTERNAL MEDICINE

## 2020-06-19 PROCEDURE — 27210794 ZZH OR GENERAL SUPPLY STERILE: Performed by: SURGERY

## 2020-06-19 PROCEDURE — 25000132 ZZH RX MED GY IP 250 OP 250 PS 637: Performed by: SURGERY

## 2020-06-19 PROCEDURE — 25800030 ZZH RX IP 258 OP 636: Performed by: ANESTHESIOLOGY

## 2020-06-19 PROCEDURE — 84100 ASSAY OF PHOSPHORUS: CPT | Performed by: PHYSICIAN ASSISTANT

## 2020-06-19 PROCEDURE — 85610 PROTHROMBIN TIME: CPT | Performed by: SURGERY

## 2020-06-19 PROCEDURE — 82330 ASSAY OF CALCIUM: CPT | Performed by: SURGERY

## 2020-06-19 PROCEDURE — 80048 BASIC METABOLIC PNL TOTAL CA: CPT | Performed by: SURGERY

## 2020-06-19 PROCEDURE — 25800030 ZZH RX IP 258 OP 636: Performed by: PHYSICIAN ASSISTANT

## 2020-06-19 PROCEDURE — 93312 ECHO TRANSESOPHAGEAL: CPT | Mod: 26 | Performed by: INTERNAL MEDICINE

## 2020-06-19 PROCEDURE — 25000128 H RX IP 250 OP 636: Performed by: SURGERY

## 2020-06-19 PROCEDURE — 99253 IP/OBS CNSLTJ NEW/EST LOW 45: CPT | Performed by: INTERNAL MEDICINE

## 2020-06-19 PROCEDURE — 82805 BLOOD GASES W/O2 SATURATION: CPT | Performed by: PHYSICIAN ASSISTANT

## 2020-06-19 PROCEDURE — B24BZZZ ULTRASONOGRAPHY OF HEART WITH AORTA: ICD-10-PCS | Performed by: SURGERY

## 2020-06-19 PROCEDURE — 83605 ASSAY OF LACTIC ACID: CPT | Performed by: PHYSICIAN ASSISTANT

## 2020-06-19 PROCEDURE — 93325 DOPPLER ECHO COLOR FLOW MAPG: CPT

## 2020-06-19 PROCEDURE — 85027 COMPLETE CBC AUTOMATED: CPT | Performed by: SURGERY

## 2020-06-19 PROCEDURE — 25000128 H RX IP 250 OP 636

## 2020-06-19 PROCEDURE — 25000128 H RX IP 250 OP 636: Performed by: ANESTHESIOLOGY

## 2020-06-19 PROCEDURE — 85730 THROMBOPLASTIN TIME PARTIAL: CPT | Performed by: PHYSICIAN ASSISTANT

## 2020-06-19 PROCEDURE — 25000132 ZZH RX MED GY IP 250 OP 250 PS 637: Performed by: PHYSICIAN ASSISTANT

## 2020-06-19 PROCEDURE — 87640 STAPH A DNA AMP PROBE: CPT | Performed by: SURGERY

## 2020-06-19 PROCEDURE — 25000128 H RX IP 250 OP 636: Performed by: NURSE ANESTHETIST, CERTIFIED REGISTERED

## 2020-06-19 PROCEDURE — C9113 INJ PANTOPRAZOLE SODIUM, VIA: HCPCS | Performed by: PHYSICIAN ASSISTANT

## 2020-06-19 PROCEDURE — 00000146 ZZHCL STATISTIC GLUCOSE BY METER IP

## 2020-06-19 PROCEDURE — 85610 PROTHROMBIN TIME: CPT | Performed by: PHYSICIAN ASSISTANT

## 2020-06-19 PROCEDURE — 37000008 ZZH ANESTHESIA TECHNICAL FEE, 1ST 30 MIN: Performed by: SURGERY

## 2020-06-19 PROCEDURE — 87641 MR-STAPH DNA AMP PROBE: CPT | Performed by: SURGERY

## 2020-06-19 PROCEDURE — 82805 BLOOD GASES W/O2 SATURATION: CPT | Performed by: SURGERY

## 2020-06-19 PROCEDURE — 36415 COLL VENOUS BLD VENIPUNCTURE: CPT | Performed by: ANESTHESIOLOGY

## 2020-06-19 PROCEDURE — 81025 URINE PREGNANCY TEST: CPT | Performed by: ANESTHESIOLOGY

## 2020-06-19 PROCEDURE — 82330 ASSAY OF CALCIUM: CPT | Performed by: PHYSICIAN ASSISTANT

## 2020-06-19 PROCEDURE — 37000009 ZZH ANESTHESIA TECHNICAL FEE, EACH ADDTL 15 MIN: Performed by: SURGERY

## 2020-06-19 PROCEDURE — 40000171 ZZH STATISTIC PRE-PROCEDURE ASSESSMENT III: Performed by: SURGERY

## 2020-06-19 PROCEDURE — 25000128 H RX IP 250 OP 636: Performed by: PHYSICIAN ASSISTANT

## 2020-06-19 PROCEDURE — 20000003 ZZH R&B ICU

## 2020-06-19 PROCEDURE — 02Q50ZZ REPAIR ATRIAL SEPTUM, OPEN APPROACH: ICD-10-PCS | Performed by: SURGERY

## 2020-06-19 PROCEDURE — 83735 ASSAY OF MAGNESIUM: CPT | Performed by: PHYSICIAN ASSISTANT

## 2020-06-19 PROCEDURE — 93010 ELECTROCARDIOGRAM REPORT: CPT | Performed by: INTERNAL MEDICINE

## 2020-06-19 PROCEDURE — P9041 ALBUMIN (HUMAN),5%, 50ML: HCPCS | Performed by: PHYSICIAN ASSISTANT

## 2020-06-19 PROCEDURE — 40000985 XR CHEST PORT 1 VW

## 2020-06-19 PROCEDURE — 80053 COMPREHEN METABOLIC PANEL: CPT | Performed by: PHYSICIAN ASSISTANT

## 2020-06-19 PROCEDURE — 85730 THROMBOPLASTIN TIME PARTIAL: CPT | Performed by: SURGERY

## 2020-06-19 PROCEDURE — 25000125 ZZHC RX 250: Performed by: SURGERY

## 2020-06-19 PROCEDURE — 25800030 ZZH RX IP 258 OP 636: Performed by: NURSE ANESTHETIST, CERTIFIED REGISTERED

## 2020-06-19 PROCEDURE — 25800030 ZZH RX IP 258 OP 636: Performed by: SURGERY

## 2020-06-19 PROCEDURE — 36000073 ZZH SURGERY LEVEL 6 1ST 30 MIN: Performed by: SURGERY

## 2020-06-19 PROCEDURE — 5A1221Z PERFORMANCE OF CARDIAC OUTPUT, CONTINUOUS: ICD-10-PCS | Performed by: SURGERY

## 2020-06-19 PROCEDURE — 85027 COMPLETE CBC AUTOMATED: CPT | Performed by: PHYSICIAN ASSISTANT

## 2020-06-19 PROCEDURE — 85384 FIBRINOGEN ACTIVITY: CPT | Performed by: SURGERY

## 2020-06-19 PROCEDURE — 83605 ASSAY OF LACTIC ACID: CPT | Performed by: SURGERY

## 2020-06-19 RX ORDER — SODIUM CHLORIDE 9 MG/ML
INJECTION, SOLUTION INTRAVENOUS CONTINUOUS PRN
Status: DISCONTINUED | OUTPATIENT
Start: 2020-06-19 | End: 2020-06-19

## 2020-06-19 RX ORDER — HYDRALAZINE HYDROCHLORIDE 20 MG/ML
10 INJECTION INTRAMUSCULAR; INTRAVENOUS EVERY 30 MIN PRN
Status: DISCONTINUED | OUTPATIENT
Start: 2020-06-19 | End: 2020-06-23 | Stop reason: HOSPADM

## 2020-06-19 RX ORDER — CEFAZOLIN SODIUM 1 G/3ML
1 INJECTION, POWDER, FOR SOLUTION INTRAMUSCULAR; INTRAVENOUS SEE ADMIN INSTRUCTIONS
Status: DISCONTINUED | OUTPATIENT
Start: 2020-06-19 | End: 2020-06-19 | Stop reason: HOSPADM

## 2020-06-19 RX ORDER — POLYETHYLENE GLYCOL 3350 17 G/17G
17 POWDER, FOR SOLUTION ORAL DAILY
Status: DISCONTINUED | OUTPATIENT
Start: 2020-06-19 | End: 2020-06-23 | Stop reason: HOSPADM

## 2020-06-19 RX ORDER — NALOXONE HYDROCHLORIDE 0.4 MG/ML
.1-.4 INJECTION, SOLUTION INTRAMUSCULAR; INTRAVENOUS; SUBCUTANEOUS
Status: DISCONTINUED | OUTPATIENT
Start: 2020-06-19 | End: 2020-06-23 | Stop reason: HOSPADM

## 2020-06-19 RX ORDER — SODIUM CHLORIDE, SODIUM LACTATE, POTASSIUM CHLORIDE, CALCIUM CHLORIDE 600; 310; 30; 20 MG/100ML; MG/100ML; MG/100ML; MG/100ML
INJECTION, SOLUTION INTRAVENOUS CONTINUOUS
Status: DISCONTINUED | OUTPATIENT
Start: 2020-06-19 | End: 2020-06-19 | Stop reason: HOSPADM

## 2020-06-19 RX ORDER — PHENYLEPHRINE HCL IN 0.9% NACL 50MG/250ML
.5-2 PLASTIC BAG, INJECTION (ML) INTRAVENOUS CONTINUOUS
Status: DISCONTINUED | OUTPATIENT
Start: 2020-06-19 | End: 2020-06-20

## 2020-06-19 RX ORDER — METOCLOPRAMIDE HYDROCHLORIDE 5 MG/ML
10 INJECTION INTRAMUSCULAR; INTRAVENOUS EVERY 6 HOURS PRN
Status: DISCONTINUED | OUTPATIENT
Start: 2020-06-19 | End: 2020-06-23 | Stop reason: HOSPADM

## 2020-06-19 RX ORDER — HYDROMORPHONE HYDROCHLORIDE 1 MG/ML
INJECTION, SOLUTION INTRAMUSCULAR; INTRAVENOUS; SUBCUTANEOUS
Status: COMPLETED
Start: 2020-06-19 | End: 2020-06-19

## 2020-06-19 RX ORDER — ALBUMIN, HUMAN INJ 5% 5 %
500-1000 SOLUTION INTRAVENOUS
Status: COMPLETED | OUTPATIENT
Start: 2020-06-19 | End: 2020-06-19

## 2020-06-19 RX ORDER — DEXTROSE MONOHYDRATE 100 MG/ML
INJECTION, SOLUTION INTRAVENOUS CONTINUOUS PRN
Status: DISCONTINUED | OUTPATIENT
Start: 2020-06-19 | End: 2020-06-23 | Stop reason: HOSPADM

## 2020-06-19 RX ORDER — SODIUM CHLORIDE, SODIUM LACTATE, POTASSIUM CHLORIDE, CALCIUM CHLORIDE 600; 310; 30; 20 MG/100ML; MG/100ML; MG/100ML; MG/100ML
INJECTION, SOLUTION INTRAVENOUS CONTINUOUS PRN
Status: DISCONTINUED | OUTPATIENT
Start: 2020-06-19 | End: 2020-06-19

## 2020-06-19 RX ORDER — METHOCARBAMOL 750 MG/1
750 TABLET, FILM COATED ORAL 4 TIMES DAILY PRN
Status: DISCONTINUED | OUTPATIENT
Start: 2020-06-19 | End: 2020-06-23 | Stop reason: HOSPADM

## 2020-06-19 RX ORDER — NITROGLYCERIN 20 MG/100ML
10-200 INJECTION INTRAVENOUS CONTINUOUS
Status: DISCONTINUED | OUTPATIENT
Start: 2020-06-19 | End: 2020-06-20

## 2020-06-19 RX ORDER — OXYCODONE HYDROCHLORIDE 5 MG/1
5-10 TABLET ORAL
Status: DISCONTINUED | OUTPATIENT
Start: 2020-06-19 | End: 2020-06-23 | Stop reason: HOSPADM

## 2020-06-19 RX ORDER — AMOXICILLIN 250 MG
2 CAPSULE ORAL 2 TIMES DAILY
Status: DISCONTINUED | OUTPATIENT
Start: 2020-06-19 | End: 2020-06-23 | Stop reason: HOSPADM

## 2020-06-19 RX ORDER — MUPIROCIN 20 MG/G
OINTMENT TOPICAL 2 TIMES DAILY
Status: DISCONTINUED | OUTPATIENT
Start: 2020-06-19 | End: 2020-06-19 | Stop reason: HOSPADM

## 2020-06-19 RX ORDER — KETOROLAC TROMETHAMINE 15 MG/ML
15 INJECTION, SOLUTION INTRAMUSCULAR; INTRAVENOUS EVERY 6 HOURS PRN
Status: COMPLETED | OUTPATIENT
Start: 2020-06-19 | End: 2020-06-20

## 2020-06-19 RX ORDER — HEPARIN SODIUM 1000 [USP'U]/ML
INJECTION, SOLUTION INTRAVENOUS; SUBCUTANEOUS PRN
Status: DISCONTINUED | OUTPATIENT
Start: 2020-06-19 | End: 2020-06-19

## 2020-06-19 RX ORDER — ONDANSETRON 2 MG/ML
INJECTION INTRAMUSCULAR; INTRAVENOUS PRN
Status: DISCONTINUED | OUTPATIENT
Start: 2020-06-19 | End: 2020-06-19

## 2020-06-19 RX ORDER — BUPIVACAINE HYDROCHLORIDE 5 MG/ML
INJECTION, SOLUTION PERINEURAL PRN
Status: DISCONTINUED | OUTPATIENT
Start: 2020-06-19 | End: 2020-06-19 | Stop reason: HOSPADM

## 2020-06-19 RX ORDER — LIDOCAINE 4 G/G
1-2 PATCH TOPICAL
Status: DISCONTINUED | OUTPATIENT
Start: 2020-06-19 | End: 2020-06-23 | Stop reason: HOSPADM

## 2020-06-19 RX ORDER — ONDANSETRON 4 MG/1
4 TABLET, ORALLY DISINTEGRATING ORAL EVERY 6 HOURS PRN
Status: DISCONTINUED | OUTPATIENT
Start: 2020-06-19 | End: 2020-06-23 | Stop reason: HOSPADM

## 2020-06-19 RX ORDER — PROCHLORPERAZINE MALEATE 5 MG
10 TABLET ORAL EVERY 6 HOURS PRN
Status: DISCONTINUED | OUTPATIENT
Start: 2020-06-19 | End: 2020-06-23 | Stop reason: HOSPADM

## 2020-06-19 RX ORDER — DEXTROSE MONOHYDRATE, SODIUM CHLORIDE, AND POTASSIUM CHLORIDE 50; 1.49; 4.5 G/1000ML; G/1000ML; G/1000ML
INJECTION, SOLUTION INTRAVENOUS CONTINUOUS
Status: DISCONTINUED | OUTPATIENT
Start: 2020-06-19 | End: 2020-06-23 | Stop reason: HOSPADM

## 2020-06-19 RX ORDER — POTASSIUM CHLORIDE 7.45 MG/ML
10 INJECTION INTRAVENOUS
Status: DISCONTINUED | OUTPATIENT
Start: 2020-06-19 | End: 2020-06-23 | Stop reason: HOSPADM

## 2020-06-19 RX ORDER — CEFAZOLIN SODIUM 1 G/3ML
1 INJECTION, POWDER, FOR SOLUTION INTRAMUSCULAR; INTRAVENOUS EVERY 8 HOURS
Status: COMPLETED | OUTPATIENT
Start: 2020-06-19 | End: 2020-06-20

## 2020-06-19 RX ORDER — POTASSIUM CHLORIDE 29.8 MG/ML
20 INJECTION INTRAVENOUS
Status: DISCONTINUED | OUTPATIENT
Start: 2020-06-19 | End: 2020-06-23 | Stop reason: HOSPADM

## 2020-06-19 RX ORDER — AMOXICILLIN 250 MG
1 CAPSULE ORAL 2 TIMES DAILY
Status: DISCONTINUED | OUTPATIENT
Start: 2020-06-19 | End: 2020-06-23 | Stop reason: HOSPADM

## 2020-06-19 RX ORDER — METOPROLOL TARTRATE 25 MG/1
25 TABLET, FILM COATED ORAL
Status: COMPLETED | OUTPATIENT
Start: 2020-06-19 | End: 2020-06-19

## 2020-06-19 RX ORDER — METOCLOPRAMIDE 10 MG/1
10 TABLET ORAL EVERY 6 HOURS PRN
Status: DISCONTINUED | OUTPATIENT
Start: 2020-06-19 | End: 2020-06-23 | Stop reason: HOSPADM

## 2020-06-19 RX ORDER — DEXTROSE MONOHYDRATE 25 G/50ML
25-50 INJECTION, SOLUTION INTRAVENOUS
Status: DISCONTINUED | OUTPATIENT
Start: 2020-06-19 | End: 2020-06-23 | Stop reason: HOSPADM

## 2020-06-19 RX ORDER — HEPARIN SODIUM 5000 [USP'U]/.5ML
5000 INJECTION, SOLUTION INTRAVENOUS; SUBCUTANEOUS EVERY 8 HOURS
Status: DISCONTINUED | OUTPATIENT
Start: 2020-06-20 | End: 2020-06-23 | Stop reason: HOSPADM

## 2020-06-19 RX ORDER — NICOTINE POLACRILEX 4 MG
15-30 LOZENGE BUCCAL
Status: DISCONTINUED | OUTPATIENT
Start: 2020-06-19 | End: 2020-06-23 | Stop reason: HOSPADM

## 2020-06-19 RX ORDER — ACETAMINOPHEN 325 MG/1
650 TABLET ORAL EVERY 4 HOURS PRN
Status: DISCONTINUED | OUTPATIENT
Start: 2020-06-22 | End: 2020-06-23 | Stop reason: HOSPADM

## 2020-06-19 RX ORDER — MUPIROCIN 20 MG/G
0.5 OINTMENT TOPICAL 2 TIMES DAILY
Status: DISCONTINUED | OUTPATIENT
Start: 2020-06-19 | End: 2020-06-20

## 2020-06-19 RX ORDER — LIDOCAINE HYDROCHLORIDE 20 MG/ML
INJECTION, SOLUTION INFILTRATION; PERINEURAL PRN
Status: DISCONTINUED | OUTPATIENT
Start: 2020-06-19 | End: 2020-06-19

## 2020-06-19 RX ORDER — HYDROMORPHONE HYDROCHLORIDE 1 MG/ML
0.2 SOLUTION ORAL ONCE
Status: DISCONTINUED | OUTPATIENT
Start: 2020-06-19 | End: 2020-06-19

## 2020-06-19 RX ORDER — POTASSIUM CHLORIDE 1.5 G/1.58G
20-40 POWDER, FOR SOLUTION ORAL
Status: DISCONTINUED | OUTPATIENT
Start: 2020-06-19 | End: 2020-06-23 | Stop reason: HOSPADM

## 2020-06-19 RX ORDER — LORAZEPAM 0.5 MG/1
0.5 TABLET ORAL DAILY PRN
Status: DISCONTINUED | OUTPATIENT
Start: 2020-06-19 | End: 2020-06-23 | Stop reason: HOSPADM

## 2020-06-19 RX ORDER — MEPERIDINE HYDROCHLORIDE 25 MG/ML
12.5-25 INJECTION INTRAMUSCULAR; INTRAVENOUS; SUBCUTANEOUS
Status: DISCONTINUED | OUTPATIENT
Start: 2020-06-19 | End: 2020-06-20

## 2020-06-19 RX ORDER — LIDOCAINE 40 MG/G
CREAM TOPICAL
Status: DISCONTINUED | OUTPATIENT
Start: 2020-06-19 | End: 2020-06-23 | Stop reason: HOSPADM

## 2020-06-19 RX ORDER — POTASSIUM CL/LIDO/0.9 % NACL 10MEQ/0.1L
10 INTRAVENOUS SOLUTION, PIGGYBACK (ML) INTRAVENOUS
Status: DISCONTINUED | OUTPATIENT
Start: 2020-06-19 | End: 2020-06-23 | Stop reason: HOSPADM

## 2020-06-19 RX ORDER — FAMOTIDINE 20 MG/1
20 TABLET, FILM COATED ORAL
Status: COMPLETED | OUTPATIENT
Start: 2020-06-19 | End: 2020-06-19

## 2020-06-19 RX ORDER — POTASSIUM CHLORIDE 1500 MG/1
20-40 TABLET, EXTENDED RELEASE ORAL
Status: DISCONTINUED | OUTPATIENT
Start: 2020-06-19 | End: 2020-06-23 | Stop reason: HOSPADM

## 2020-06-19 RX ORDER — PROTAMINE SULFATE 10 MG/ML
INJECTION, SOLUTION INTRAVENOUS PRN
Status: DISCONTINUED | OUTPATIENT
Start: 2020-06-19 | End: 2020-06-19

## 2020-06-19 RX ORDER — SODIUM CHLORIDE 9 MG/ML
INJECTION, SOLUTION INTRAVENOUS CONTINUOUS
Status: DISCONTINUED | OUTPATIENT
Start: 2020-06-19 | End: 2020-06-23 | Stop reason: HOSPADM

## 2020-06-19 RX ORDER — ONDANSETRON 2 MG/ML
4 INJECTION INTRAMUSCULAR; INTRAVENOUS EVERY 6 HOURS PRN
Status: DISCONTINUED | OUTPATIENT
Start: 2020-06-19 | End: 2020-06-23 | Stop reason: HOSPADM

## 2020-06-19 RX ORDER — MAGNESIUM SULFATE HEPTAHYDRATE 40 MG/ML
2 INJECTION, SOLUTION INTRAVENOUS DAILY PRN
Status: DISCONTINUED | OUTPATIENT
Start: 2020-06-19 | End: 2020-06-23 | Stop reason: HOSPADM

## 2020-06-19 RX ORDER — VENLAFAXINE HYDROCHLORIDE 75 MG/1
75 CAPSULE, EXTENDED RELEASE ORAL 2 TIMES DAILY PRN
Status: DISCONTINUED | OUTPATIENT
Start: 2020-06-19 | End: 2020-06-23 | Stop reason: HOSPADM

## 2020-06-19 RX ORDER — ACETAMINOPHEN 325 MG/1
975 TABLET ORAL EVERY 8 HOURS
Status: COMPLETED | OUTPATIENT
Start: 2020-06-19 | End: 2020-06-22

## 2020-06-19 RX ORDER — FENTANYL CITRATE 50 UG/ML
50 INJECTION, SOLUTION INTRAMUSCULAR; INTRAVENOUS EVERY 5 MIN PRN
Status: COMPLETED | OUTPATIENT
Start: 2020-06-19 | End: 2020-06-19

## 2020-06-19 RX ORDER — CEFAZOLIN SODIUM 2 G/100ML
2 INJECTION, SOLUTION INTRAVENOUS
Status: COMPLETED | OUTPATIENT
Start: 2020-06-19 | End: 2020-06-19

## 2020-06-19 RX ORDER — VECURONIUM BROMIDE 1 MG/ML
INJECTION, POWDER, LYOPHILIZED, FOR SOLUTION INTRAVENOUS PRN
Status: DISCONTINUED | OUTPATIENT
Start: 2020-06-19 | End: 2020-06-19

## 2020-06-19 RX ORDER — MAGNESIUM SULFATE HEPTAHYDRATE 40 MG/ML
4 INJECTION, SOLUTION INTRAVENOUS EVERY 4 HOURS PRN
Status: DISCONTINUED | OUTPATIENT
Start: 2020-06-19 | End: 2020-06-23 | Stop reason: HOSPADM

## 2020-06-19 RX ADMIN — DOCUSATE SODIUM AND SENNOSIDES 1 TABLET: 8.6; 5 TABLET, FILM COATED ORAL at 21:01

## 2020-06-19 RX ADMIN — ROCURONIUM BROMIDE 50 MG: 10 INJECTION INTRAVENOUS at 07:38

## 2020-06-19 RX ADMIN — PHENYLEPHRINE HYDROCHLORIDE 100 MCG: 10 INJECTION INTRAVENOUS at 08:54

## 2020-06-19 RX ADMIN — KETOROLAC TROMETHAMINE 15 MG: 15 INJECTION, SOLUTION INTRAMUSCULAR; INTRAVENOUS at 12:37

## 2020-06-19 RX ADMIN — OXYCODONE HYDROCHLORIDE 10 MG: 5 TABLET ORAL at 20:57

## 2020-06-19 RX ADMIN — AMINOCAPROIC ACID 5 G/HR: 250 INJECTION, SOLUTION INTRAVENOUS at 08:09

## 2020-06-19 RX ADMIN — METOPROLOL TARTRATE 25 MG: 25 TABLET, FILM COATED ORAL at 06:19

## 2020-06-19 RX ADMIN — OXYCODONE HYDROCHLORIDE 5 MG: 5 TABLET ORAL at 12:55

## 2020-06-19 RX ADMIN — MIDAZOLAM 1 MG: 1 INJECTION INTRAMUSCULAR; INTRAVENOUS at 10:16

## 2020-06-19 RX ADMIN — PHENYLEPHRINE HYDROCHLORIDE 100 MCG: 10 INJECTION INTRAVENOUS at 10:30

## 2020-06-19 RX ADMIN — LIDOCAINE HYDROCHLORIDE 100 MG: 20 INJECTION, SOLUTION INFILTRATION; PERINEURAL at 07:38

## 2020-06-19 RX ADMIN — MUPIROCIN: 20 OINTMENT TOPICAL at 07:03

## 2020-06-19 RX ADMIN — HEPARIN SODIUM 28000 UNITS: 1000 INJECTION INTRAVENOUS; SUBCUTANEOUS at 08:38

## 2020-06-19 RX ADMIN — PROTAMINE SULFATE 180 MG: 10 INJECTION, SOLUTION INTRAVENOUS at 10:39

## 2020-06-19 RX ADMIN — CEFAZOLIN 1 G: 330 INJECTION, POWDER, FOR SOLUTION INTRAMUSCULAR; INTRAVENOUS at 17:45

## 2020-06-19 RX ADMIN — HYDROMORPHONE HYDROCHLORIDE 0.2 MG: 1 INJECTION, SOLUTION INTRAMUSCULAR; INTRAVENOUS; SUBCUTANEOUS at 12:26

## 2020-06-19 RX ADMIN — ACETAMINOPHEN 975 MG: 325 TABLET, FILM COATED ORAL at 13:33

## 2020-06-19 RX ADMIN — ONDANSETRON 4 MG: 2 INJECTION INTRAMUSCULAR; INTRAVENOUS at 10:59

## 2020-06-19 RX ADMIN — FENTANYL CITRATE 100 MCG: 50 INJECTION, SOLUTION INTRAMUSCULAR; INTRAVENOUS at 08:23

## 2020-06-19 RX ADMIN — SODIUM CHLORIDE, POTASSIUM CHLORIDE, SODIUM LACTATE AND CALCIUM CHLORIDE: 600; 310; 30; 20 INJECTION, SOLUTION INTRAVENOUS at 07:33

## 2020-06-19 RX ADMIN — FENTANYL CITRATE 100 MCG: 50 INJECTION, SOLUTION INTRAMUSCULAR; INTRAVENOUS at 08:32

## 2020-06-19 RX ADMIN — SODIUM CHLORIDE: 9 INJECTION, SOLUTION INTRAVENOUS at 07:33

## 2020-06-19 RX ADMIN — FAMOTIDINE 20 MG: 20 TABLET ORAL at 06:19

## 2020-06-19 RX ADMIN — LIDOCAINE 2 PATCH: 560 PATCH PERCUTANEOUS; TOPICAL; TRANSDERMAL at 12:30

## 2020-06-19 RX ADMIN — POTASSIUM CHLORIDE 20 MEQ: 29.8 INJECTION, SOLUTION INTRAVENOUS at 12:38

## 2020-06-19 RX ADMIN — ACETAMINOPHEN 975 MG: 325 TABLET, FILM COATED ORAL at 22:01

## 2020-06-19 RX ADMIN — SUGAMMADEX 200 MG: 100 INJECTION, SOLUTION INTRAVENOUS at 11:16

## 2020-06-19 RX ADMIN — FENTANYL CITRATE 100 MCG: 50 INJECTION, SOLUTION INTRAMUSCULAR; INTRAVENOUS at 07:04

## 2020-06-19 RX ADMIN — FENTANYL CITRATE 250 MCG: 50 INJECTION, SOLUTION INTRAMUSCULAR; INTRAVENOUS at 07:38

## 2020-06-19 RX ADMIN — PHENYLEPHRINE HYDROCHLORIDE 0.25 MCG/KG/MIN: 10 INJECTION INTRAVENOUS at 08:04

## 2020-06-19 RX ADMIN — MIDAZOLAM 2 MG: 1 INJECTION INTRAMUSCULAR; INTRAVENOUS at 07:35

## 2020-06-19 RX ADMIN — CEFAZOLIN SODIUM 1 G: 2 INJECTION, SOLUTION INTRAVENOUS at 09:52

## 2020-06-19 RX ADMIN — POTASSIUM CHLORIDE, DEXTROSE MONOHYDRATE AND SODIUM CHLORIDE: 150; 5; 450 INJECTION, SOLUTION INTRAVENOUS at 12:34

## 2020-06-19 RX ADMIN — SODIUM CHLORIDE, POTASSIUM CHLORIDE, SODIUM LACTATE AND CALCIUM CHLORIDE: 600; 310; 30; 20 INJECTION, SOLUTION INTRAVENOUS at 07:06

## 2020-06-19 RX ADMIN — KETOROLAC TROMETHAMINE 15 MG: 15 INJECTION, SOLUTION INTRAMUSCULAR; INTRAVENOUS at 18:33

## 2020-06-19 RX ADMIN — VECURONIUM BROMIDE 5 MG: 1 INJECTION, POWDER, LYOPHILIZED, FOR SOLUTION INTRAVENOUS at 08:24

## 2020-06-19 RX ADMIN — PANTOPRAZOLE SODIUM 40 MG: 40 INJECTION, POWDER, FOR SOLUTION INTRAVENOUS at 12:33

## 2020-06-19 RX ADMIN — CEFAZOLIN SODIUM 2 G: 2 INJECTION, SOLUTION INTRAVENOUS at 07:52

## 2020-06-19 RX ADMIN — NICOTINE 1 PATCH: 7 PATCH, EXTENDED RELEASE TRANSDERMAL at 17:45

## 2020-06-19 RX ADMIN — METHOCARBAMOL TABLETS 750 MG: 750 TABLET, COATED ORAL at 22:17

## 2020-06-19 RX ADMIN — VECURONIUM BROMIDE 2 MG: 1 INJECTION, POWDER, LYOPHILIZED, FOR SOLUTION INTRAVENOUS at 10:00

## 2020-06-19 RX ADMIN — MIDAZOLAM 6 MG: 1 INJECTION INTRAMUSCULAR; INTRAVENOUS at 07:38

## 2020-06-19 RX ADMIN — PHENYLEPHRINE HYDROCHLORIDE 100 MCG: 10 INJECTION INTRAVENOUS at 10:28

## 2020-06-19 RX ADMIN — ALBUMIN HUMAN 250 ML: 0.05 INJECTION, SOLUTION INTRAVENOUS at 16:06

## 2020-06-19 RX ADMIN — OXYCODONE HYDROCHLORIDE 10 MG: 5 TABLET ORAL at 15:16

## 2020-06-19 RX ADMIN — ALBUMIN HUMAN 250 ML: 0.05 INJECTION, SOLUTION INTRAVENOUS at 15:34

## 2020-06-19 RX ADMIN — MIDAZOLAM 2 MG: 1 INJECTION INTRAMUSCULAR; INTRAVENOUS at 07:04

## 2020-06-19 RX ADMIN — EPINEPHRINE 0.03 MCG/KG/MIN: 1 INJECTION INTRAMUSCULAR; INTRAVENOUS; SUBCUTANEOUS at 10:26

## 2020-06-19 RX ADMIN — METHOCARBAMOL TABLETS 750 MG: 750 TABLET, COATED ORAL at 14:21

## 2020-06-19 RX ADMIN — SODIUM CHLORIDE: 9 INJECTION, SOLUTION INTRAVENOUS at 13:40

## 2020-06-19 ASSESSMENT — ACTIVITIES OF DAILY LIVING (ADL)
ADLS_ACUITY_SCORE: 15
ADLS_ACUITY_SCORE: 16
ADLS_ACUITY_SCORE: 10

## 2020-06-19 NOTE — ANESTHESIA PROCEDURE NOTES
CENTRAL LINE INSERTION PROCEDURE NOTE:   Staff -   Anesthesiologist:  Marco Antonio Gil MD      Performed By: Anesthesiologist        Pre-Procedure  Performed by Marco Antonio Gil MD  Location: pre-op      Pre-Anesthestic Checklist: patient identified, IV checked, site marked, risks and benefits discussed, informed consent, monitors and equipment checked, pre-op evaluation and at physician/surgeon's request    Timeout  Correct Patient: Yes   Correct Procedure: Yes   Correct Site: Yes   Correct Laterality: Yes   Correct Position: Yes   Site Marked: Yes   .   Procedure Documentation   Procedure: central line, new line and elective  Position: Trendelenburg  Patient Prep/Sterile Barriers; chlorhexidine gluconate and isopropyl alcohol, maximum sterile barriers used during central venous catheter insertion  Diagnosis:PFO/ASD    Insertion Site:right, internal jugular    Skin infiltrated with 5 mL of 1% lidocaine.  Catheter size: 9 Fr Cordis.  Assessment/Narrative    Catheter size: 9 Fr Cordis.     Secured by suture  Tegaderm and Biopatch dressing used.  blood aspirated from all lumens  All lumens flushed: Yes  Verification method: Ultrasound, Placement to be verified post-op and X-ray (CXR in ICU)  Comments:  Ultrasound Interpretation, central venous    1. Ultrasound guidance was used to evaluate potential access sites.  2. Ultrasound was also used to verify the patency of the vessel specified above.   3. Ultrasound was used to visualize the needle entering the vessel.   4. The visualized structures were anatomically normal.  5. There were no apparent abnormal pathological findings.  6. A permanent ultrasound image was saved in the patient's record.

## 2020-06-19 NOTE — PROGRESS NOTES
Telephone order to hold heparin drip for 1 hour for chest tube removal, order relayed to assigned RN.

## 2020-06-19 NOTE — CONSULTS
CARDIAC SURGERY NUTRITION CONSULT    Received standing order to assess and educate patient.    Will follow and complete assessment once patient is extubated and/or is transferred to medical unit.    Patient will receive nutrition education during the Outpatient Cardiac Rehab Program (nutrition classes/dietitian counseling).    Myriam Pham RD, LD, [facility]   Clinical Dietitian - [facility]

## 2020-06-19 NOTE — ANESTHESIA PREPROCEDURE EVALUATION
Anesthesia Pre-Procedure Evaluation    Patient: Teri Call   MRN: [id number] : 1985          Preoperative Diagnosis: Patent foramen ovale [Q21.1]    Procedure(s):  PATENT FORAMEN OVALE CLOSURE; MINAMALLY INVASIVE    Past Medical History:   Diagnosis Date     Acute CVA (cerebrovascular accident) (H) 2020    ischemic vs migraine sequelae  R occipital lobe     Anxiety      Depression      Migraine      PFO (patent foramen ovale) 2020     Pyelonephritis      No past surgical history on file.    Anesthesia Evaluation     .             ROS/MED HX    ENT/Pulmonary:     (+)tobacco use, Current use , . .   (-) asthma and sleep apnea   Neurologic:     (+)migraines, CVA date: 2020 with deficits- mild L facial droop, otherwise fully resolved,     Cardiovascular:     (+) ----. : . . . :. . congenital heart disease PFO/ASD/s:, Previous cardiac testing Echodate:2020results:Interpretation Summary     The left ventricle is normal in size.  The visual ejection fraction is estimated at 55-60%.  No regional wall motion abnormalities noted.  No thrombus is detected in the left atrial appendage.  A patent foramen ovale is present.  0.27cm PFO and what appears to be two fenestrations in the atrial septum  measuring 0.2cm and 0.1cm with visible bubble crossover.     PLEASE SEE ATTACHED IMAGES IN REPORT  _____________________________________________________________________________  __        LEVON  I determined this patient to be an appropriate candidate for the planned  sedation and procedure and have reassessed the patient immediately prior to  sedation and procedure. Total sedation time: 20 min minutes of continuous  bedside 1:1 monitoring. Versed (6mg) was given intravenously. Fentanyl  (175mcg) was given intravenously. Prior to the exam, the oral cavity was  checked and no overcrowding was noted. Consent to the procedure was obtained  prior to sedation. The transesophageal probe was passed without  difficulty.  There were no complications associated with this procedure.     Left Ventricle  The left ventricle is normal in size. There is normal left ventricular wall  thickness. The visual ejection fraction is estimated at 55-60%. No regional  wall motion abnormalities noted.     Right Ventricle  The right ventricle is normal in size and function.     Atria  Normal left atrial size. Right atrial size is normal. The atrial septum is  aneurysmal. A patent foramen ovale is present. 0.27cm PFO and what appears to  be two fenestrations in the atrial septum measuring 0.2cm and 0.1cm with  visible bubble crossover.     PLEASE SEE ATTACHED IMAGES IN REPORT. A contrast injection (Bubble Study) was  performed that was severely positive for flow across the interatrial septum.  No thrombus is detected in the left atrial appendage.        Mitral Valve  The mitral valve is normal in structure and function. There is no mitral  regurgitation noted.     Tricuspid Valve  There is trace tricuspid regurgitation.     Aortic Valve  Normal tricuspid aortic valve. No aortic regurgitation is present.     Pulmonic Valve  There is trace pulmonic valvular regurgitation.     Vessels  The aortic root is normal size. Normal size ascending aorta.        Pericardial/Pleural  There is no pericardial effusion.  _____________________________________________________________________________  __                                                  _____________________________________________________________________________  __                     0.27cm PFO channel                     0.2cm Fenestration #1                                                   0.1cm Fenestration #2            Two fenestrations side by side same                                                            framedate: results:ECG reviewed date:SR with SA results: date: results:          METS/Exercise Tolerance:     Hematologic:         Musculoskeletal:         GI/Hepatic:        "  Renal/Genitourinary:     (+) Other Renal/ Genitourinary, pyelonephritis      Endo:         Psychiatric:     (+) psychiatric history anxiety and depression      Infectious Disease:         Malignancy:         Other:                          Physical Exam  Normal systems: cardiovascular    Airway   Mallampati: III  TM distance: >3 FB  Neck ROM: full    Dental   (+) upper dentures    Cardiovascular       Pulmonary     Other findings: Cracked bottom back teeth, B/L        Lab Results   Component Value Date    WBC 6.9 06/17/2020    HGB 14.3 06/17/2020    HCT 42.1 06/17/2020     06/17/2020    CRP <2.9 04/25/2020    SED 4 04/25/2020     06/17/2020    POTASSIUM 3.2 (L) 06/17/2020    CHLORIDE 109 06/17/2020    CO2 25 06/17/2020    BUN 10 06/17/2020    CR 0.79 06/17/2020    GLC 79 06/17/2020    THIAGO 8.7 06/17/2020    ALBUMIN 3.9 06/17/2020    PROTTOTAL 7.5 06/17/2020    ALT 22 06/17/2020    AST 16 06/17/2020    ALKPHOS 80 06/17/2020    BILITOTAL 0.4 06/17/2020    PTT 26 04/24/2020    INR 1.09 04/24/2020    TSH 1.48 04/24/2020    HCGS Negative 04/24/2020       Preop Vitals  BP Readings from Last 3 Encounters:   06/12/20 125/82   06/08/20 105/71   05/27/20 116/77    Pulse Readings from Last 3 Encounters:   06/12/20 62   06/08/20 68   05/27/20 65      Resp Readings from Last 3 Encounters:   06/08/20 18   04/26/20 20    SpO2 Readings from Last 3 Encounters:   06/08/20 100%   04/26/20 99%      Temp Readings from Last 1 Encounters:   04/26/20 36.2  C (97.2  F) (Oral)    Ht Readings from Last 1 Encounters:   05/27/20 1.803 m (5' 11\")      Wt Readings from Last 1 Encounters:   06/12/20 67 kg (147 lb 9.6 oz)    Estimated body mass index is 20.59 kg/m  as calculated from the following:    Height as of 5/27/20: 1.803 m (5' 11\").    Weight as of 6/12/20: 67 kg (147 lb 9.6 oz).       Anesthesia Plan      History & Physical Review  History and physical reviewed and following examination; no interval change.    ASA Status:  3 " .    NPO Status:  > 8 hours    Plan for General with Intravenous induction. Maintenance will be Inhalation.    PONV prophylaxis:  Ondansetron (or other 5HT-3)  Induction with Lidocaine, Fentanyl, Versed and Hung  50mL bag of NS with microdripper from Protamine admin  Possible extubation in OR        Postoperative Care  Postoperative pain management:  IV analgesics.      Consents  Anesthetic plan, risks, benefits and alternatives discussed with:  Patient.  Use of blood products discussed: Yes.   Use of blood products discussed with Patient.  Consented to blood products.  .                 Marco Antonio Gil MD

## 2020-06-19 NOTE — ANESTHESIA PROCEDURE NOTES
ARTERIAL LINE PROCEDURE NOTE:  Staff -   Anesthesiologist:  Marco Antonio Gil MD      Performed By: Anesthesiologist         Pre-Procedure  Performed by Marco Antonio Gil MD  Location: pre-op      Pre-Anesthestic Checklist: patient identified, IV checked, site marked, risks and benefits discussed, informed consent, monitors and equipment checked, pre-op evaluation and at physician/surgeon's request    Timeout  Correct Patient: Yes   Correct Procedure: Yes   Correct Site: Yes   Correct Laterality: Yes   Correct Position: Yes   Site Marked: Yes   .   Procedure Documentation  Procedure: arterial line  Diagnosis:PFO/ASD ASA 3  Supine  Insertion Site:left (Radial).Skin infiltrated with 1 mL of 1% lidocaine. Injection technique: Seldinger Technique  .  .  Patient Prep/Sterile Barriers; all elements of maximal sterile barrier technique followed, mask, hat, sterile gown, sterile gloves, draped, hand hygiene, chlorhexidine gluconate and isopropyl alcohol    Assessment/Narrative    Catheter: 20 gauge, 1.75 in/4.5 cm quick cath (integral wire)     Secured by suture  Tegaderm dressing used.    Arterial waveform: Yes IBP within 10% of NIBP: Yes

## 2020-06-19 NOTE — ANESTHESIA POSTPROCEDURE EVALUATION
Patient: Teri Call    Procedure(s):  MINIMALLY INVASIVE PATENT FORAMEN OVALE CLOSURE, ON PUMP WITH LEVON READ BY CARDIOLOGIST DR ACUNA.    Diagnosis:Patent foramen ovale [Q21.1]  Diagnosis Additional Information: No value filed.    Anesthesia Type:  General    Note:  Anesthesia Post Evaluation    Patient location during evaluation: ICU  Patient participation: Able to fully participate in evaluation  Level of consciousness: awake and alert  Pain management: adequate  Airway patency: patent  Cardiovascular status: acceptable  Respiratory status: acceptable  Hydration status: acceptable  PONV: none     Anesthetic complications: None          Last vitals:  Vitals:    06/19/20 1145 06/19/20 1200 06/19/20 1215   BP:   (!) 111/93   Pulse:      Resp: 19 20 19   Temp:  35.9  C (96.6  F) 35.8  C (96.4  F)   SpO2: 96% 100% 98%         Electronically Signed By: Marco Antonio Gil MD  June 19, 2020  12:39 PM

## 2020-06-19 NOTE — ANESTHESIA CARE TRANSFER NOTE
Patient: Teri Call    Procedure(s):  MINIMALLY INVASIVE PATENT FORAMEN OVALE CLOSURE, ON PUMP WITH LEVON READ BY CARDIOLOGIST DR ACUNA.    Diagnosis: Patent foramen ovale [Q21.1]  Diagnosis Additional Information: No value filed.    Anesthesia Type:   General     Note:  Airway :Face Mask  Patient transferred to:ICU  ICU Handoff: Call for PAUSE to initiate/utilize ICU HANDOFF, Identified Patient, Identified Responsible Provider, Reviewed the Pertinent Medical History, Discussed Surgical Course, Reviewed Intra-OP Anesthesia Management and Issues during Anesthesia, Set Expectations for Post Procedure Period and Allowed Opportunity for Questions and Acknowledgement of Understanding      Vitals: (Last set prior to Anesthesia Care Transfer)    CRNA VITALS  6/19/2020 1108 - 6/19/2020 1203      6/19/2020             Resp Rate (set):  10                Electronically Signed By: ELZA Pond CRNA  June 19, 2020  11:53 PM

## 2020-06-19 NOTE — BRIEF OP NOTE
[facility]    Brief Operative Note    Pre-operative diagnosis: Patent foramen ovale [Q21.1]  Post-operative diagnosis Same as pre-operative diagnosis    Procedure: Procedure(s):  MINIMALLY INVASIVE PATENT FORAMEN OVALE CLOSURE, ON PUMP WITH LEVON READ BY CARDIOLOGIST DR ACUNA.  Surgeon: Surgeon(s) and Role:     * Marguerite Kaplan MD - Primary     * Alberta Fuller PA-C - Assisting  Anesthesia: General   Estimated blood loss: * No values recorded between 6/19/2020  8:27 AM and 6/19/2020 11:30 AM *  Drains: 2 right pleural chest tubes  Specimens: * No specimens in log *  Findings:   large ASD.  Complications: None.  Implants: * No implants in log *

## 2020-06-19 NOTE — PLAN OF CARE
Neuro: AXOX4, c/o right upper back pain, prn meds given, UIC with 2 person assist.  CV: SR, BP within parameters, pulses palpable  Pulm: RA, LS clear to dim, unable to take deep breath due to pain per patient, CT X2 with small sanguinous drainage, airleak present  GI/: clear liquid diet, no flatus, dewey with good uop  Skin: right chest, right groin dsg CDi  Lines: left rad art line, RIJ with TLC, PIVX1    Plan: Rest overnight, UIC with meal X3. Pain management, continue to monitor

## 2020-06-19 NOTE — CONSULTS
"Asked to assist with postop pulmonary management.    Ms. Call has a history of possible CVA for which he needed hospital admission from 4/24/2022 4/28/2020.  Work-up subsequently revealed large PFO/ASD.  Other medical problems include anxiety/depression, migraine headaches and prior history of pyelonephritis.  She also is a current smoker.  Because the anatomy of the PFO/ASD was complex she was not a good candidate for percutaneous closure.  Today she had minimally invasive on pump PFO closure.  She was extubated in the PACU.    The patient reports some chest discomfort.  She reports her breathing is \"okay\".    Medications, past medical history, social history reviewed.    BP 92/62   Pulse 71   Temp 97.9  F (36.6  C) (Bladder)   Resp 11   Wt 68 kg (150 lb)   LMP  (LMP Unknown)   SpO2 97%   BMI 20.92 kg/m    General: Still sedated  Chest is clear  Heart: Regular rate rhythm  Extremities: Well perfused warm  Neuro: No obvious focal deficits      CXR:New right chest tubes. No pneumothorax. Right IJ catheter tip in the SVC. There is atelectasis in the right lung base. The left lung is clear.    Recent Labs   Lab 06/19/20  1159 06/19/20  1055 06/17/20  1303   WBC 19.1* 18.1* 6.9   RBC 3.68* 2.98* 4.63   HGB 11.4* 9.4* 14.3   HCT 33.6* 27.3* 42.1    164 266     Recent Labs   Lab 06/19/20  1159 06/19/20  1055 06/19/20  0604 06/17/20  1303    140  --  138   POTASSIUM 3.8 3.6 3.5 3.2*   CHLORIDE 113* 110*  --  109   CO2 25 24  --  25   BUN 4* 4*  --  10   CR 0.78 0.71  --  0.79   * 125*  --  79   THIAGO 8.3* 8.1*  --  8.7     Recent Labs   Lab 06/19/20  1159 06/19/20  1055 06/19/20  1004 06/19/20  0938   PH 7.32* 7.31* 7.43 7.29*   PCO2 48* 48* 36 52*   PO2 175* 225* 388* 440*   HCO3 25 24 24 25   O2PER  70 80     Assessment and plan: 34-year-old female with prior history of PFO/ASD, stroke now status post minimally invasive PFO closure surgically.  Comorbid conditions include a history of " pyelonephritis, current history of tobacco use, anxiety/depression and migraine headaches.  From pulmonary standpoint she is extubated and doing quite well.  - Postoperative pulmonary toilet  -Pain control  - Bronchodilators PRN    Remaining management per CV surgery    Initial visit 35 mintues

## 2020-06-20 ENCOUNTER — APPOINTMENT (OUTPATIENT)
Dept: PHYSICAL THERAPY | Facility: CLINIC | Age: 35
End: 2020-06-20
Attending: PHYSICIAN ASSISTANT
Payer: COMMERCIAL

## 2020-06-20 ENCOUNTER — APPOINTMENT (OUTPATIENT)
Dept: GENERAL RADIOLOGY | Facility: CLINIC | Age: 35
End: 2020-06-20
Attending: PHYSICIAN ASSISTANT
Payer: COMMERCIAL

## 2020-06-20 LAB
ALBUMIN SERPL-MCNC: 3 G/DL (ref 3.4–5)
ALP SERPL-CCNC: 52 U/L (ref 40–150)
ALT SERPL W P-5'-P-CCNC: 18 U/L (ref 0–50)
ANION GAP SERPL CALCULATED.3IONS-SCNC: 4 MMOL/L (ref 3–14)
AST SERPL W P-5'-P-CCNC: 38 U/L (ref 0–45)
BILIRUB SERPL-MCNC: 1.3 MG/DL (ref 0.2–1.3)
BLD PROD TYP BPU: NORMAL
BLD PROD TYP BPU: NORMAL
BLD UNIT ID BPU: 0
BLD UNIT ID BPU: 0
BLOOD PRODUCT CODE: NORMAL
BLOOD PRODUCT CODE: NORMAL
BPU ID: NORMAL
BPU ID: NORMAL
BUN SERPL-MCNC: 6 MG/DL (ref 7–30)
CA-I BLD-MCNC: 4.5 MG/DL (ref 4.4–5.2)
CALCIUM SERPL-MCNC: 7.9 MG/DL (ref 8.5–10.1)
CHLORIDE SERPL-SCNC: 109 MMOL/L (ref 94–109)
CO2 SERPL-SCNC: 25 MMOL/L (ref 20–32)
CREAT SERPL-MCNC: 0.68 MG/DL (ref 0.52–1.04)
ERYTHROCYTE [DISTWIDTH] IN BLOOD BY AUTOMATED COUNT: 12.6 % (ref 10–15)
GFR SERPL CREATININE-BSD FRML MDRD: >90 ML/MIN/{1.73_M2}
GLUCOSE BLDC GLUCOMTR-MCNC: 110 MG/DL (ref 70–99)
GLUCOSE BLDC GLUCOMTR-MCNC: 123 MG/DL (ref 70–99)
GLUCOSE BLDC GLUCOMTR-MCNC: 129 MG/DL (ref 70–99)
GLUCOSE BLDC GLUCOMTR-MCNC: 91 MG/DL (ref 70–99)
GLUCOSE BLDC GLUCOMTR-MCNC: 97 MG/DL (ref 70–99)
GLUCOSE SERPL-MCNC: 96 MG/DL (ref 70–99)
HCT VFR BLD AUTO: 30.9 % (ref 35–47)
HGB BLD-MCNC: 10.6 G/DL (ref 11.7–15.7)
MAGNESIUM SERPL-MCNC: 2.4 MG/DL (ref 1.6–2.3)
MCH RBC QN AUTO: 31.4 PG (ref 26.5–33)
MCHC RBC AUTO-ENTMCNC: 34.3 G/DL (ref 31.5–36.5)
MCV RBC AUTO: 91 FL (ref 78–100)
PHOSPHATE SERPL-MCNC: 3.6 MG/DL (ref 2.5–4.5)
PLATELET # BLD AUTO: 147 10E9/L (ref 150–450)
POTASSIUM SERPL-SCNC: 4 MMOL/L (ref 3.4–5.3)
PROT SERPL-MCNC: 5.3 G/DL (ref 6.8–8.8)
RBC # BLD AUTO: 3.38 10E12/L (ref 3.8–5.2)
SODIUM SERPL-SCNC: 138 MMOL/L (ref 133–144)
TRANSFUSION STATUS PATIENT QL: NORMAL
WBC # BLD AUTO: 10.8 10E9/L (ref 4–11)

## 2020-06-20 PROCEDURE — 85027 COMPLETE CBC AUTOMATED: CPT | Performed by: PHYSICIAN ASSISTANT

## 2020-06-20 PROCEDURE — 83735 ASSAY OF MAGNESIUM: CPT | Performed by: PHYSICIAN ASSISTANT

## 2020-06-20 PROCEDURE — 80053 COMPREHEN METABOLIC PANEL: CPT | Performed by: PHYSICIAN ASSISTANT

## 2020-06-20 PROCEDURE — 25000132 ZZH RX MED GY IP 250 OP 250 PS 637: Performed by: PHYSICIAN ASSISTANT

## 2020-06-20 PROCEDURE — 25000128 H RX IP 250 OP 636: Performed by: PHYSICIAN ASSISTANT

## 2020-06-20 PROCEDURE — 97110 THERAPEUTIC EXERCISES: CPT | Mod: GP

## 2020-06-20 PROCEDURE — 93010 ELECTROCARDIOGRAM REPORT: CPT | Performed by: INTERNAL MEDICINE

## 2020-06-20 PROCEDURE — P9047 ALBUMIN (HUMAN), 25%, 50ML: HCPCS | Performed by: PHYSICIAN ASSISTANT

## 2020-06-20 PROCEDURE — 84100 ASSAY OF PHOSPHORUS: CPT | Performed by: PHYSICIAN ASSISTANT

## 2020-06-20 PROCEDURE — 82330 ASSAY OF CALCIUM: CPT | Performed by: PHYSICIAN ASSISTANT

## 2020-06-20 PROCEDURE — C9113 INJ PANTOPRAZOLE SODIUM, VIA: HCPCS | Performed by: PHYSICIAN ASSISTANT

## 2020-06-20 PROCEDURE — 71045 X-RAY EXAM CHEST 1 VIEW: CPT

## 2020-06-20 PROCEDURE — 12000000 ZZH R&B MED SURG/OB

## 2020-06-20 PROCEDURE — 93005 ELECTROCARDIOGRAM TRACING: CPT

## 2020-06-20 PROCEDURE — 97530 THERAPEUTIC ACTIVITIES: CPT | Mod: GP

## 2020-06-20 PROCEDURE — 00000146 ZZHCL STATISTIC GLUCOSE BY METER IP

## 2020-06-20 PROCEDURE — 97161 PT EVAL LOW COMPLEX 20 MIN: CPT | Mod: GP

## 2020-06-20 RX ORDER — DEXTROSE MONOHYDRATE 25 G/50ML
25-50 INJECTION, SOLUTION INTRAVENOUS
Status: DISCONTINUED | OUTPATIENT
Start: 2020-06-20 | End: 2020-06-20

## 2020-06-20 RX ORDER — ALBUMIN (HUMAN) 12.5 G/50ML
25 SOLUTION INTRAVENOUS ONCE
Status: COMPLETED | OUTPATIENT
Start: 2020-06-20 | End: 2020-06-20

## 2020-06-20 RX ORDER — NICOTINE POLACRILEX 4 MG
15-30 LOZENGE BUCCAL
Status: DISCONTINUED | OUTPATIENT
Start: 2020-06-20 | End: 2020-06-20

## 2020-06-20 RX ADMIN — DOCUSATE SODIUM AND SENNOSIDES 1 TABLET: 8.6; 5 TABLET, FILM COATED ORAL at 08:49

## 2020-06-20 RX ADMIN — NICOTINE 1 PATCH: 7 PATCH, EXTENDED RELEASE TRANSDERMAL at 16:03

## 2020-06-20 RX ADMIN — OXYCODONE HYDROCHLORIDE 10 MG: 5 TABLET ORAL at 04:20

## 2020-06-20 RX ADMIN — PANTOPRAZOLE SODIUM 40 MG: 40 INJECTION, POWDER, FOR SOLUTION INTRAVENOUS at 08:49

## 2020-06-20 RX ADMIN — DOCUSATE SODIUM AND SENNOSIDES 2 TABLET: 8.6; 5 TABLET, FILM COATED ORAL at 20:59

## 2020-06-20 RX ADMIN — ALBUMIN HUMAN 25 G: 0.25 SOLUTION INTRAVENOUS at 11:15

## 2020-06-20 RX ADMIN — CEFAZOLIN 1 G: 330 INJECTION, POWDER, FOR SOLUTION INTRAMUSCULAR; INTRAVENOUS at 02:44

## 2020-06-20 RX ADMIN — KETOROLAC TROMETHAMINE 15 MG: 15 INJECTION, SOLUTION INTRAMUSCULAR; INTRAVENOUS at 08:47

## 2020-06-20 RX ADMIN — LIDOCAINE 2 PATCH: 560 PATCH PERCUTANEOUS; TOPICAL; TRANSDERMAL at 07:32

## 2020-06-20 RX ADMIN — HEPARIN SODIUM 5000 UNITS: 5000 INJECTION, SOLUTION INTRAVENOUS; SUBCUTANEOUS at 19:03

## 2020-06-20 RX ADMIN — ACETAMINOPHEN 975 MG: 325 TABLET, FILM COATED ORAL at 13:42

## 2020-06-20 RX ADMIN — LORAZEPAM 0.5 MG: 0.5 TABLET ORAL at 13:42

## 2020-06-20 RX ADMIN — CEFAZOLIN 1 G: 330 INJECTION, POWDER, FOR SOLUTION INTRAMUSCULAR; INTRAVENOUS at 09:28

## 2020-06-20 RX ADMIN — KETOROLAC TROMETHAMINE 15 MG: 15 INJECTION, SOLUTION INTRAMUSCULAR; INTRAVENOUS at 00:32

## 2020-06-20 RX ADMIN — OXYCODONE HYDROCHLORIDE 10 MG: 5 TABLET ORAL at 12:50

## 2020-06-20 RX ADMIN — ASPIRIN 325 MG: 325 TABLET, COATED ORAL at 08:49

## 2020-06-20 RX ADMIN — ACETAMINOPHEN 975 MG: 325 TABLET, FILM COATED ORAL at 05:48

## 2020-06-20 RX ADMIN — OXYCODONE HYDROCHLORIDE 10 MG: 5 TABLET ORAL at 19:03

## 2020-06-20 RX ADMIN — VENLAFAXINE HYDROCHLORIDE 75 MG: 75 CAPSULE, EXTENDED RELEASE ORAL at 19:03

## 2020-06-20 RX ADMIN — POTASSIUM CHLORIDE 20 MEQ: 1500 TABLET, EXTENDED RELEASE ORAL at 08:50

## 2020-06-20 RX ADMIN — METHOCARBAMOL TABLETS 750 MG: 750 TABLET, COATED ORAL at 21:43

## 2020-06-20 RX ADMIN — OXYCODONE HYDROCHLORIDE 5 MG: 5 TABLET ORAL at 22:58

## 2020-06-20 RX ADMIN — OXYCODONE HYDROCHLORIDE 10 MG: 5 TABLET ORAL at 00:31

## 2020-06-20 RX ADMIN — POLYETHYLENE GLYCOL 3350 17 G: 17 POWDER, FOR SOLUTION ORAL at 08:49

## 2020-06-20 RX ADMIN — HEPARIN SODIUM 5000 UNITS: 5000 INJECTION, SOLUTION INTRAVENOUS; SUBCUTANEOUS at 11:15

## 2020-06-20 RX ADMIN — OXYCODONE HYDROCHLORIDE 10 MG: 5 TABLET ORAL at 07:32

## 2020-06-20 RX ADMIN — ACETAMINOPHEN 975 MG: 325 TABLET, FILM COATED ORAL at 21:00

## 2020-06-20 RX ADMIN — METHOCARBAMOL TABLETS 750 MG: 750 TABLET, COATED ORAL at 09:28

## 2020-06-20 RX ADMIN — OXYCODONE HYDROCHLORIDE 10 MG: 5 TABLET ORAL at 16:02

## 2020-06-20 RX ADMIN — METHOCARBAMOL TABLETS 750 MG: 750 TABLET, COATED ORAL at 15:25

## 2020-06-20 ASSESSMENT — ACTIVITIES OF DAILY LIVING (ADL)
ADLS_ACUITY_SCORE: 12
ADLS_ACUITY_SCORE: 14
ADLS_ACUITY_SCORE: 14
ADLS_ACUITY_SCORE: 15

## 2020-06-20 NOTE — PLAN OF CARE
Patient to 33 from ICU at 1300. Vital signs stable on RA, baseline soft BP.  Tele NSR. A/Ox4. R side and groin incisions liquid bandage, WDL. LS diminished, fine crackles bases. CT x2 serosangeous drainage, airleak persists. Dressing CDI, pacers capped. + gas, voided well post dewey removal. Walked valdez x2 with SBA. Up in recliner. IS to 1000. Pain controlled with oxycodone, robaxin, tylenol, lidocaine patch and heat packs. Ativan and effexor for anxiety. Encouraged ROM exercises. Patient updated her family.

## 2020-06-20 NOTE — PLAN OF CARE
Pt is A&O x4. Pain was an issue throughout the night, IV Toradol, Robaxin, Oxycodone and Tylenol given. Encouraged to use IS. Nik off this morning.

## 2020-06-20 NOTE — PROGRESS NOTES
[facility]  Cardiovascular and Thoracic Surgery Daily Note          Assessment and Plan:   POD#1 s/p Right mini thoracotomy, PFO closure, right common femoral arterial and venous cannulation for cardiopulmonary bypass, intraoperative LEVON by Dr. Marguerite Kaplan.   -CVS: HR: 60s, SBP: 90s/60s, ASA, no BB as HR too low, no statin as no CAD, sub q heparin  -Resp: Extubated per protocol, sating well on RA  -Neuro: grossly intact, pain controlled with current regimen  -Renal: good UO, Crea: 0.68,up 2 kg above pre-op weight  -GI:  Continue bowel regimen, miralax  -:  [place] in place, limited mobility, accurate Is & Os  -Endo: pre-op A1C 5.2  -FEN: replete lytes as needed, Na: 138, K: 4.0, Orders Placed This Encounter      Regular Diet Adult  -ID: WBC: 10.8, Temp (24hrs), Av.3  F (36.8  C), Min:96.4  F (35.8  C), Max:99.5  F (37.5  C), completing suzan-op abx  -Heme: Hgb: 10.6, plt: 147   -Proph: PCD, ASA, BB when able, statin, sub q heparin  -Dispo: ICU-->St 33, initiate therapies, encourage IS          Interval History:   Sitting up in chair, breathing fine, tolerating diet, pain controlled with current regimen          Medications:       acetaminophen  975 mg Oral Q8H     aspirin  325 mg Oral Daily     heparin ANTICOAGULANT  5,000 Units Subcutaneous Q8H     insulin aspart   Subcutaneous TID w/meals     lidocaine  1-2 patch Transdermal Q24H     lidocaine   Transdermal Q8H     nicotine  1 patch Transdermal Daily     nicotine   Transdermal Q8H     pantoprazole (PROTONIX) IV  40 mg Intravenous Daily     polyethylene glycol  17 g Oral Daily     low cardioplegia solution   PERFUSION Once     high cardioplegia solution   PERFUSION Once     senna-docusate  1 tablet Oral BID    Or     senna-docusate  2 tablet Oral BID     sodium chloride (PF)  3 mL Intracatheter Q8H     [START ON 2020] acetaminophen, dextrose, glucose **OR** dextrose **OR** glucagon, hydrALAZINE, insulin aspart, lidocaine 4%, lidocaine  (buffered or not buffered), LORazepam, magnesium sulfate, magnesium sulfate, melatonin, meperidine, methocarbamol, metoclopramide **OR** metoclopramide, naloxone, ondansetron **OR** ondansetron, oxyCODONE, potassium chloride, potassium chloride with lidocaine, potassium chloride, potassium chloride, potassium chloride, potassium phosphate (KPHOS) in D5W IV, potassium phosphate (KPHOS) in D5W IV, potassium phosphate (KPHOS) in D5W IV, potassium phosphate (KPHOS) in D5W IV, potassium phosphate (KPHOS) in D5W IV, prochlorperazine **OR** prochlorperazine, BETA BLOCKER NOT PRESCRIBED, sodium chloride (PF), venlafaxine          Physical Exam:   Vitals were reviewed  Blood pressure 94/58, pulse 66, temperature 98.8  F (37.1  C), temperature source Bladder, resp. rate 16, weight 70.8 kg (156 lb 1.4 oz), SpO2 97 %.  Rhythm: NSR    Lungs: course    Cardiovascular: s1/s2, no m/r/g    Abdomen: s/nt/nd    Extremeties: no LE edema    Incision: cdi    CT: to suction    Weight:   Vitals:    06/19/20 0608 06/20/20 0515   Weight: 68 kg (150 lb) 70.8 kg (156 lb 1.4 oz)            Data:   Labs:   Lab Results   Component Value Date    WBC 10.8 06/20/2020     Lab Results   Component Value Date    RBC 3.38 06/20/2020     Lab Results   Component Value Date    HGB 10.6 06/20/2020     Lab Results   Component Value Date    HCT 30.9 06/20/2020     No components found for: MCT  Lab Results   Component Value Date    MCV 91 06/20/2020     Lab Results   Component Value Date    MCH 31.4 06/20/2020     Lab Results   Component Value Date    MCHC 34.3 06/20/2020     Lab Results   Component Value Date    RDW 12.6 06/20/2020     Lab Results   Component Value Date     06/20/2020       Last Basic Metabolic Panel:  Lab Results   Component Value Date     06/20/2020      Lab Results   Component Value Date    POTASSIUM 4.0 06/20/2020     Lab Results   Component Value Date    CHLORIDE 109 06/20/2020     Lab Results   Component Value Date    THIAGO  7.9 06/20/2020     Lab Results   Component Value Date    CO2 25 06/20/2020     Lab Results   Component Value Date    BUN 6 06/20/2020     Lab Results   Component Value Date    CR 0.68 06/20/2020     Lab Results   Component Value Date    GLC 96 06/20/2020       CXR:Heart is normal in size. Right IJ catheter SVC level. There are 2 right-sided chest tubes. Trace right apical pneumothorax measures 1 mm. Small amount right lower lung atelectasis with slight improvement. Left lung clear. Minimal subcutaneous   emphysema right lower lateral chest.    Indira Cuevas PA-C  Pager #: [phone]

## 2020-06-20 NOTE — PLAN OF CARE
Discharge Planner PT   Patient plan for discharge: Home  Current status: Pt is 34 year old female adm on 6/19/2020 for PFO closure via R mini-thoracotomy. At baseline, pt is independent without assistive device, lives with spouse and children in multi-level home. PT/CR orders received, chart reviewed, evaluation completed and treatment initiated. Pt is min assist for bed mobility, SBA for sit to stand transfers. Pt able to ambulate 30'x2 around room with SBA, steady gait, no LOB. Pt does demonstrate hypotension at rest, RN aware. Pt's main complaint during session is R UE and back pain.  Barriers to return to prior living situation: None from a mobility standpoint  Recommendations for discharge: Home  Rationale for recommendations: Anticipate pt will continue to progress to allow for safe return to home when medically stable.       Entered by: Charmaine Quach 06/20/2020 1:24 PM

## 2020-06-20 NOTE — PROGRESS NOTES
06/20/20 1145   Quick Adds   Type of Visit Initial PT Evaluation   Living Environment   Lives With spouse   Living Arrangements house   Transportation Anticipated car, drives self;family or friend will provide   Living Environment Comment Pt lives with spouse and 4 children in multi-level home, youngest child is 8 years old.   Self-Care   Usual Activity Tolerance good   Current Activity Tolerance moderate   Regular Exercise No   Equipment Currently Used at Home none   Activity/Exercise/Self-Care Comment Pt reports being independent and active prior to admission   Functional Level Prior   Ambulation 0-->independent   Transferring 0-->independent   Fall history within last six months no   Prior Functional Level Comment Independent without use of assistive device   General Information   Onset of Illness/Injury or Date of Surgery - Date 06/19/20   Referring Physician Alberta Fuller PA-C   Patient/Family Goals Statement To get back home to her kids   Pertinent History of Current Problem (include personal factors and/or comorbidities that impact the POC) Pt is 34 year old female adm on 6/19/2020 for scheduled PFO closure via min thoracotomy on the right.   Precautions/Limitations fall precautions   Cognitive Status Examination   Orientation orientation to person, place and time   Level of Consciousness alert   Pain Assessment   Patient Currently in Pain Yes, see Vital Sign flowsheet  (signficant R UE pain due to chest tubes)   Posture    Posture Not impaired   Range of Motion (ROM)   ROM Comment B LE ROM WFL   Strength   Strength Comments B LE strength WFL   Bed Mobility   Bed Mobility Comments Min assist   Transfer Skills   Transfer Comments SBA   Gait   Gait Comments SBA   Balance   Balance Comments No LOB during functional tasks   General Therapy Interventions   Planned Therapy Interventions bed mobility training;gait training;strengthening;stretching;transfer training;risk factor education;home program  "guidelines;progressive activity/exercise   Clinical Impression   Criteria for Skilled Therapeutic Intervention yes, treatment indicated   PT Diagnosis Impaired gait   Influenced by the following impairments R UE pain, decreased activity tolerance   Functional limitations due to impairments Decreased ability to participate in daily tasks   Clinical Presentation Stable/Uncomplicated   Clinical Presentation Rationale current presentation, [facility]   Clinical Decision Making (Complexity) Low complexity   Therapy Frequency Daily   Predicted Duration of Therapy Intervention (days/wks) 3 days   Anticipated Discharge Disposition Home   Risk & Benefits of therapy have been explained Yes   Patient, Family & other staff in agreement with plan of care Yes   [facility] TM \"6 Clicks\"   2016, Trustees of [facility], under license to [employer].  All rights reserved.   6 Clicks Short Forms Basic Mobility Inpatient Short Form   [facility]  \"6 Clicks\" V.2 Basic Mobility Inpatient Short Form   1. Turning from your back to your side while in a flat bed without using bedrails? 3 - A Little   2. Moving from lying on your back to sitting on the side of a flat bed without using bedrails? 3 - A Little   3. Moving to and from a bed to a chair (including a wheelchair)? 3 - A Little   4. Standing up from a chair using your arms (e.g., wheelchair, or bedside chair)? 3 - A Little   5. To walk in hospital room? 3 - A Little   6. Climbing 3-5 steps with a railing? 3 - A Little   Basic Mobility Raw Score (Score out of 24.Lower scores equate to lower levels of function) 18   Total Evaluation Time   Total Evaluation Time (Minutes) 10     "

## 2020-06-21 ENCOUNTER — APPOINTMENT (OUTPATIENT)
Dept: PHYSICAL THERAPY | Facility: CLINIC | Age: 35
End: 2020-06-21
Attending: SURGERY
Payer: COMMERCIAL

## 2020-06-21 LAB
ERYTHROCYTE [DISTWIDTH] IN BLOOD BY AUTOMATED COUNT: 12.7 % (ref 10–15)
GLUCOSE BLDC GLUCOMTR-MCNC: 105 MG/DL (ref 70–99)
GLUCOSE BLDC GLUCOMTR-MCNC: 126 MG/DL (ref 70–99)
GLUCOSE BLDC GLUCOMTR-MCNC: 92 MG/DL (ref 70–99)
GLUCOSE BLDC GLUCOMTR-MCNC: 98 MG/DL (ref 70–99)
HCT VFR BLD AUTO: 33.8 % (ref 35–47)
HGB BLD-MCNC: 11.3 G/DL (ref 11.7–15.7)
MCH RBC QN AUTO: 31 PG (ref 26.5–33)
MCHC RBC AUTO-ENTMCNC: 33.4 G/DL (ref 31.5–36.5)
MCV RBC AUTO: 93 FL (ref 78–100)
PLATELET # BLD AUTO: 165 10E9/L (ref 150–450)
RBC # BLD AUTO: 3.65 10E12/L (ref 3.8–5.2)
WBC # BLD AUTO: 8.2 10E9/L (ref 4–11)

## 2020-06-21 PROCEDURE — 00000146 ZZHCL STATISTIC GLUCOSE BY METER IP

## 2020-06-21 PROCEDURE — 12000000 ZZH R&B MED SURG/OB

## 2020-06-21 PROCEDURE — C9113 INJ PANTOPRAZOLE SODIUM, VIA: HCPCS | Performed by: PHYSICIAN ASSISTANT

## 2020-06-21 PROCEDURE — 25000132 ZZH RX MED GY IP 250 OP 250 PS 637: Performed by: PHYSICIAN ASSISTANT

## 2020-06-21 PROCEDURE — 36415 COLL VENOUS BLD VENIPUNCTURE: CPT | Performed by: PHYSICIAN ASSISTANT

## 2020-06-21 PROCEDURE — 97530 THERAPEUTIC ACTIVITIES: CPT | Mod: GP | Performed by: PHYSICAL THERAPIST

## 2020-06-21 PROCEDURE — 25000128 H RX IP 250 OP 636: Performed by: PHYSICIAN ASSISTANT

## 2020-06-21 PROCEDURE — 97110 THERAPEUTIC EXERCISES: CPT | Mod: GP | Performed by: PHYSICAL THERAPIST

## 2020-06-21 PROCEDURE — 85027 COMPLETE CBC AUTOMATED: CPT | Performed by: PHYSICIAN ASSISTANT

## 2020-06-21 RX ORDER — COLCHICINE 0.6 MG/1
0.6 TABLET ORAL DAILY
Status: DISCONTINUED | OUTPATIENT
Start: 2020-06-21 | End: 2020-06-23 | Stop reason: HOSPADM

## 2020-06-21 RX ORDER — FUROSEMIDE 10 MG/ML
20 INJECTION INTRAMUSCULAR; INTRAVENOUS ONCE
Status: COMPLETED | OUTPATIENT
Start: 2020-06-21 | End: 2020-06-21

## 2020-06-21 RX ORDER — PANTOPRAZOLE SODIUM 40 MG/1
40 TABLET, DELAYED RELEASE ORAL
Status: DISCONTINUED | OUTPATIENT
Start: 2020-06-22 | End: 2020-06-23 | Stop reason: HOSPADM

## 2020-06-21 RX ADMIN — HEPARIN SODIUM 5000 UNITS: 5000 INJECTION, SOLUTION INTRAVENOUS; SUBCUTANEOUS at 11:38

## 2020-06-21 RX ADMIN — METHOCARBAMOL TABLETS 750 MG: 750 TABLET, COATED ORAL at 04:36

## 2020-06-21 RX ADMIN — OXYCODONE HYDROCHLORIDE 10 MG: 5 TABLET ORAL at 15:06

## 2020-06-21 RX ADMIN — ACETAMINOPHEN 975 MG: 325 TABLET, FILM COATED ORAL at 06:33

## 2020-06-21 RX ADMIN — ACETAMINOPHEN 975 MG: 325 TABLET, FILM COATED ORAL at 14:42

## 2020-06-21 RX ADMIN — METHOCARBAMOL TABLETS 750 MG: 750 TABLET, COATED ORAL at 20:20

## 2020-06-21 RX ADMIN — HEPARIN SODIUM 5000 UNITS: 5000 INJECTION, SOLUTION INTRAVENOUS; SUBCUTANEOUS at 04:36

## 2020-06-21 RX ADMIN — NICOTINE 1 PATCH: 7 PATCH, EXTENDED RELEASE TRANSDERMAL at 17:12

## 2020-06-21 RX ADMIN — FUROSEMIDE 20 MG: 10 INJECTION, SOLUTION INTRAVENOUS at 08:14

## 2020-06-21 RX ADMIN — VENLAFAXINE HYDROCHLORIDE 75 MG: 75 CAPSULE, EXTENDED RELEASE ORAL at 08:31

## 2020-06-21 RX ADMIN — OXYCODONE HYDROCHLORIDE 10 MG: 5 TABLET ORAL at 11:38

## 2020-06-21 RX ADMIN — DOCUSATE SODIUM AND SENNOSIDES 2 TABLET: 8.6; 5 TABLET, FILM COATED ORAL at 08:11

## 2020-06-21 RX ADMIN — HEPARIN SODIUM 5000 UNITS: 5000 INJECTION, SOLUTION INTRAVENOUS; SUBCUTANEOUS at 20:18

## 2020-06-21 RX ADMIN — OXYCODONE HYDROCHLORIDE 10 MG: 5 TABLET ORAL at 20:20

## 2020-06-21 RX ADMIN — METHOCARBAMOL TABLETS 750 MG: 750 TABLET, COATED ORAL at 12:00

## 2020-06-21 RX ADMIN — POLYETHYLENE GLYCOL 3350 17 G: 17 POWDER, FOR SOLUTION ORAL at 08:11

## 2020-06-21 RX ADMIN — ASPIRIN 325 MG: 325 TABLET, COATED ORAL at 08:12

## 2020-06-21 RX ADMIN — DOCUSATE SODIUM AND SENNOSIDES 2 TABLET: 8.6; 5 TABLET, FILM COATED ORAL at 20:18

## 2020-06-21 RX ADMIN — COLCHICINE 0.6 MG: 0.6 TABLET, FILM COATED ORAL at 08:12

## 2020-06-21 RX ADMIN — OXYCODONE HYDROCHLORIDE 5 MG: 5 TABLET ORAL at 06:35

## 2020-06-21 RX ADMIN — ACETAMINOPHEN 975 MG: 325 TABLET, FILM COATED ORAL at 22:49

## 2020-06-21 RX ADMIN — OXYCODONE HYDROCHLORIDE 5 MG: 5 TABLET ORAL at 02:24

## 2020-06-21 RX ADMIN — PANTOPRAZOLE SODIUM 40 MG: 40 INJECTION, POWDER, FOR SOLUTION INTRAVENOUS at 08:14

## 2020-06-21 ASSESSMENT — ACTIVITIES OF DAILY LIVING (ADL)
ADLS_ACUITY_SCORE: 12

## 2020-06-21 NOTE — PLAN OF CARE
Vital signs stable on RA, baseline soft BP.  Tele NSR. A/Ox4. R side and groin incisions liquid bandage, WDL, some erythema. LS diminished, fine crackles bases. CT x2 serosangeous drainage, airleak persists. Dressing CDI, pacers capped. + gas, no BM. Voiding well, lasix this AM. Went on 4 long walks today. Up in recliner. IS to 1500. Pain controlled with oxycodone, robaxin, and tylenol. Stating pain controlled better today.

## 2020-06-21 NOTE — PROGRESS NOTES
[facility]  Cardiovascular and Thoracic Surgery Daily Note          Assessment and Plan:   POD#2 s/p Right mini thoracotomy, PFO closure, right common femoral arterial and venous cannulation for cardiopulmonary bypass, intraoperative LEVON by Dr. Marguerite Kaplan.     -CVS: HR: 60s, SBP: 90s/60s, ASA, no BB as HR too low, no statin as no CAD, sub q heparin, start colchicine     -Resp: Extubated per protocol, sating well on RA    -Neuro: grossly intact, pain controlled with current regimen    -Renal: good UO, Crea: stable<0.68,up 2 kg above pre-op weight    -GI:  Continue bowel regimen, miralax    -:  Remove Coyne today    -Endo: pre-op A1C 5.2    -FEN: replete lytes as needed, Na: stable 138, K: stable 4.0, Orders Placed This Encounter      Regular Diet Adult    -ID: WBC: stable 10.8, Temp (24hrs), Av.5  F (36.9  C), Min:98.1  F (36.7  C), Max:98.8  F (37.1  C), completed suzan-op abx    -Heme: Hgb & plt stable    -Proph: PCD, ASA, BB when able, statin, sub q heparin    -Dispo: [facility], continue therapies, encourage IS          Interval History:   Sitting up in bed, pain controlled, breathing fine, tolerating diet          Medications:       acetaminophen  975 mg Oral Q8H     aspirin  325 mg Oral Daily     colchicine  0.6 mg Oral Daily     heparin ANTICOAGULANT  5,000 Units Subcutaneous Q8H     insulin aspart  1-7 Units Subcutaneous TID AC     insulin aspart  1-5 Units Subcutaneous At Bedtime     lidocaine  1-2 patch Transdermal Q24H     lidocaine   Transdermal Q8H     nicotine  1 patch Transdermal Daily     nicotine   Transdermal Q8H     pantoprazole (PROTONIX) IV  40 mg Intravenous Daily     polyethylene glycol  17 g Oral Daily     low cardioplegia solution   PERFUSION Once     high cardioplegia solution   PERFUSION Once     senna-docusate  1 tablet Oral BID    Or     senna-docusate  2 tablet Oral BID     sodium chloride (PF)  3 mL Intracatheter Q8H     @MEDSPRN-@          Physical Exam:   Vitals  were reviewed  Blood pressure 103/64, pulse 74, temperature 98.4  F (36.9  C), temperature source Oral, resp. rate 16, weight 70 kg (154 lb 5.2 oz), SpO2 94 %.  Rhythm: NSR    Lungs: course    Cardiovascular: s1/s2, no m/r/g    Abdomen: s/nt/nd    Extremeties: no LE edema    Incision: cdi    CT: to waterseal     Weight:   Vitals:    06/19/20 0608 06/20/20 0515 06/20/20 1607 06/21/20 0610   Weight: 68 kg (150 lb) 70.8 kg (156 lb 1.4 oz) 70.7 kg (155 lb 13.8 oz) 70 kg (154 lb 5.2 oz)            Data:   Labs:   Lab Results   Component Value Date    WBC 10.8 06/20/2020     Lab Results   Component Value Date    RBC 3.38 06/20/2020     Lab Results   Component Value Date    HGB 10.6 06/20/2020     Lab Results   Component Value Date    HCT 30.9 06/20/2020     No components found for: MCT  Lab Results   Component Value Date    MCV 91 06/20/2020     Lab Results   Component Value Date    MCH 31.4 06/20/2020     Lab Results   Component Value Date    MCHC 34.3 06/20/2020     Lab Results   Component Value Date    RDW 12.6 06/20/2020     Lab Results   Component Value Date     06/20/2020       Last Basic Metabolic Panel:  Lab Results   Component Value Date     06/20/2020      Lab Results   Component Value Date    POTASSIUM 4.0 06/20/2020     Lab Results   Component Value Date    CHLORIDE 109 06/20/2020     Lab Results   Component Value Date    THIAGO 7.9 06/20/2020     Lab Results   Component Value Date    CO2 25 06/20/2020     Lab Results   Component Value Date    BUN 6 06/20/2020     Lab Results   Component Value Date    CR 0.68 06/20/2020     Lab Results   Component Value Date    GLC 96 06/20/2020         Indira Cuevas PA-C  Pager #: [phone]

## 2020-06-21 NOTE — PLAN OF CARE
"Discharge Planner PT   Patient plan for discharge: Home with OP CR phase II, states \"They said I can do that somewhere in [place].\"  Current status: Sup<>sit with HOB elevated, SBA with assist for CT management. Sit<>stand SBA. Ambulated 12 min in the valdez with CGA, slow, steady pace. Max HR 84. Stable CV response. Reports the R shoulder pain is \"better,\" today.  Barriers to return to prior living situation: None  Recommendations for discharge: Home with OP CR phase II  Rationale for recommendations: Pt will benefit from continued skilled rehab services to closely monitor and progress exercise and activity tolerance as well as continue education for optimal heart health.        Entered by: Soheila Sawant 06/21/2020 9:56 AM       "

## 2020-06-22 ENCOUNTER — APPOINTMENT (OUTPATIENT)
Dept: PHYSICAL THERAPY | Facility: CLINIC | Age: 35
End: 2020-06-22
Attending: SURGERY
Payer: COMMERCIAL

## 2020-06-22 LAB
ANION GAP SERPL CALCULATED.3IONS-SCNC: 4 MMOL/L (ref 3–14)
BUN SERPL-MCNC: 8 MG/DL (ref 7–30)
CALCIUM SERPL-MCNC: 8.8 MG/DL (ref 8.5–10.1)
CHLORIDE SERPL-SCNC: 107 MMOL/L (ref 94–109)
CO2 SERPL-SCNC: 26 MMOL/L (ref 20–32)
CREAT SERPL-MCNC: 0.7 MG/DL (ref 0.52–1.04)
ERYTHROCYTE [DISTWIDTH] IN BLOOD BY AUTOMATED COUNT: 12.8 % (ref 10–15)
GFR SERPL CREATININE-BSD FRML MDRD: >90 ML/MIN/{1.73_M2}
GLUCOSE BLDC GLUCOMTR-MCNC: 102 MG/DL (ref 70–99)
GLUCOSE BLDC GLUCOMTR-MCNC: 82 MG/DL (ref 70–99)
GLUCOSE BLDC GLUCOMTR-MCNC: 88 MG/DL (ref 70–99)
GLUCOSE BLDC GLUCOMTR-MCNC: 97 MG/DL (ref 70–99)
GLUCOSE SERPL-MCNC: 87 MG/DL (ref 70–99)
HCT VFR BLD AUTO: 35 % (ref 35–47)
HGB BLD-MCNC: 11.7 G/DL (ref 11.7–15.7)
MCH RBC QN AUTO: 30.9 PG (ref 26.5–33)
MCHC RBC AUTO-ENTMCNC: 33.4 G/DL (ref 31.5–36.5)
MCV RBC AUTO: 92 FL (ref 78–100)
PLATELET # BLD AUTO: 179 10E9/L (ref 150–450)
POTASSIUM SERPL-SCNC: 3.6 MMOL/L (ref 3.4–5.3)
RBC # BLD AUTO: 3.79 10E12/L (ref 3.8–5.2)
SODIUM SERPL-SCNC: 137 MMOL/L (ref 133–144)
WBC # BLD AUTO: 7.2 10E9/L (ref 4–11)

## 2020-06-22 PROCEDURE — 97110 THERAPEUTIC EXERCISES: CPT | Mod: GP | Performed by: PHYSICAL THERAPIST

## 2020-06-22 PROCEDURE — 00000146 ZZHCL STATISTIC GLUCOSE BY METER IP

## 2020-06-22 PROCEDURE — 25000132 ZZH RX MED GY IP 250 OP 250 PS 637: Performed by: SURGERY

## 2020-06-22 PROCEDURE — 25000132 ZZH RX MED GY IP 250 OP 250 PS 637: Performed by: PHYSICIAN ASSISTANT

## 2020-06-22 PROCEDURE — 12000000 ZZH R&B MED SURG/OB

## 2020-06-22 PROCEDURE — 36415 COLL VENOUS BLD VENIPUNCTURE: CPT | Performed by: PHYSICIAN ASSISTANT

## 2020-06-22 PROCEDURE — 97530 THERAPEUTIC ACTIVITIES: CPT | Mod: GP | Performed by: PHYSICAL THERAPIST

## 2020-06-22 PROCEDURE — 85027 COMPLETE CBC AUTOMATED: CPT | Performed by: PHYSICIAN ASSISTANT

## 2020-06-22 PROCEDURE — 80048 BASIC METABOLIC PNL TOTAL CA: CPT | Performed by: PHYSICIAN ASSISTANT

## 2020-06-22 PROCEDURE — 25000128 H RX IP 250 OP 636: Performed by: PHYSICIAN ASSISTANT

## 2020-06-22 RX ORDER — MAGNESIUM CARB/ALUMINUM HYDROX 105-160MG
148 TABLET,CHEWABLE ORAL ONCE
Status: DISCONTINUED | OUTPATIENT
Start: 2020-06-22 | End: 2020-06-23 | Stop reason: HOSPADM

## 2020-06-22 RX ADMIN — OXYCODONE HYDROCHLORIDE 10 MG: 5 TABLET ORAL at 19:41

## 2020-06-22 RX ADMIN — DOCUSATE SODIUM AND SENNOSIDES 2 TABLET: 8.6; 5 TABLET, FILM COATED ORAL at 08:05

## 2020-06-22 RX ADMIN — ASPIRIN 325 MG: 325 TABLET, COATED ORAL at 08:05

## 2020-06-22 RX ADMIN — HEPARIN SODIUM 5000 UNITS: 5000 INJECTION, SOLUTION INTRAVENOUS; SUBCUTANEOUS at 04:49

## 2020-06-22 RX ADMIN — POLYETHYLENE GLYCOL 3350 17 G: 17 POWDER, FOR SOLUTION ORAL at 08:05

## 2020-06-22 RX ADMIN — METHOCARBAMOL TABLETS 750 MG: 750 TABLET, COATED ORAL at 08:05

## 2020-06-22 RX ADMIN — ACETAMINOPHEN 650 MG: 325 TABLET, FILM COATED ORAL at 14:26

## 2020-06-22 RX ADMIN — OXYCODONE HYDROCHLORIDE 10 MG: 5 TABLET ORAL at 11:18

## 2020-06-22 RX ADMIN — METHOCARBAMOL TABLETS 750 MG: 750 TABLET, COATED ORAL at 20:42

## 2020-06-22 RX ADMIN — OXYCODONE HYDROCHLORIDE 5 MG: 5 TABLET ORAL at 00:42

## 2020-06-22 RX ADMIN — METHOCARBAMOL TABLETS 750 MG: 750 TABLET, COATED ORAL at 14:26

## 2020-06-22 RX ADMIN — PANTOPRAZOLE SODIUM 40 MG: 40 TABLET, DELAYED RELEASE ORAL at 06:36

## 2020-06-22 RX ADMIN — HEPARIN SODIUM 5000 UNITS: 5000 INJECTION, SOLUTION INTRAVENOUS; SUBCUTANEOUS at 20:19

## 2020-06-22 RX ADMIN — COLCHICINE 0.6 MG: 0.6 TABLET, FILM COATED ORAL at 08:05

## 2020-06-22 RX ADMIN — HEPARIN SODIUM 5000 UNITS: 5000 INJECTION, SOLUTION INTRAVENOUS; SUBCUTANEOUS at 12:30

## 2020-06-22 RX ADMIN — OXYCODONE HYDROCHLORIDE 10 MG: 5 TABLET ORAL at 16:31

## 2020-06-22 RX ADMIN — ACETAMINOPHEN 650 MG: 325 TABLET, FILM COATED ORAL at 19:41

## 2020-06-22 RX ADMIN — POTASSIUM CHLORIDE 20 MEQ: 1500 TABLET, EXTENDED RELEASE ORAL at 14:26

## 2020-06-22 RX ADMIN — NICOTINE 1 PATCH: 7 PATCH, EXTENDED RELEASE TRANSDERMAL at 16:32

## 2020-06-22 RX ADMIN — ACETAMINOPHEN 975 MG: 325 TABLET, FILM COATED ORAL at 06:36

## 2020-06-22 RX ADMIN — OXYCODONE HYDROCHLORIDE 10 MG: 5 TABLET ORAL at 06:36

## 2020-06-22 ASSESSMENT — ACTIVITIES OF DAILY LIVING (ADL)
ADLS_ACUITY_SCORE: 12

## 2020-06-22 NOTE — PLAN OF CARE
A/o x4. AVSS on RA. Pain managed w/ oxy and robaxin. Rt chest and groin incisions SRINIVAS. CT pulled this afternoon, CT dressing CDI. LS clear. BS active, +flatus, +BM. Voiding. Regular diet. K+ replaced. Up independent. Tele NSR. Will CTM

## 2020-06-22 NOTE — PROGRESS NOTES
[facility]  Cardiovascular and Thoracic Surgery Daily Note          Assessment and Plan:   POD#3 s/p Right mini thoracotomy, PFO closure, right common femoral arterial and venous cannulation for cardiopulmonary bypass, intraoperative LEVON by Dr. Marguerite Kaplan.   -CVS: HR: 70s-80s. SBP: 90s-100s. Pre op EF: 55*-60%. ASA, no BB d/t low SBP, no statin as no CAD. Started on colchicine for pericarditis prophylaxis. Chest tubes with minimal output, no air leak-- tidaling. Will discontinue chest tubes today, patient must ambulate first.   -Resp: Extubated within protocol. Saturating well on room air. Continue to encourage IS, cough, deep breathing, ambulation.   -Neuro:  Grossly intact. Pain controlled.   -Renal: good UOP. Up about 1kg from preoperative weight. Will continue to monitor  -GI:  Tolerating diet. No BM. Continue bowel regimen. Will add one dose mag citrate.   -:  Voiding well on own  -Endo: pre op A1c: 5.2. Completed insulin gtt per protocol. Continue sliding scale insulin.   -FEN: replace electrolytes as needed.   Orders Placed This Encounter      Regular Diet Adult    -ID: Temp (24hrs), Av  F (36.7  C), Min:97.5  F (36.4  C), Max:98.3  F (36.8  C)  Completed perioperative abx.   -Heme: Hgb/plt stable. Acute  -Proph: PCD, ASA, BB when able. PPI, sub q heparin  -Dispo: [facility]. Continue therapies. Encourage IS, cough, deep breathing, ambulation. Plan to remove chest tubes today. Repeat CXR. Needs to have BM. Home tomorrow if appropriate.           Interval History:   No acute events overnight. Saturating well on room air. Pain controlled. Tolerating diet. No BM.          Medications:       aspirin  325 mg Oral Daily     colchicine  0.6 mg Oral Daily     heparin ANTICOAGULANT  5,000 Units Subcutaneous Q8H     insulin aspart  1-7 Units Subcutaneous TID AC     insulin aspart  1-5 Units Subcutaneous At Bedtime     lidocaine  1-2 patch Transdermal Q24H     lidocaine   Transdermal Q8H      nicotine  1 patch Transdermal Daily     nicotine   Transdermal Q8H     pantoprazole  40 mg Oral QAM AC     polyethylene glycol  17 g Oral Daily     low cardioplegia solution   PERFUSION Once     high cardioplegia solution   PERFUSION Once     senna-docusate  1 tablet Oral BID    Or     senna-docusate  2 tablet Oral BID     sodium chloride (PF)  3 mL Intracatheter Q8H     acetaminophen, dextrose, glucose **OR** dextrose **OR** glucagon, hydrALAZINE, insulin aspart, lidocaine 4%, lidocaine (buffered or not buffered), LORazepam, magnesium sulfate, magnesium sulfate, melatonin, methocarbamol, metoclopramide **OR** metoclopramide, naloxone, ondansetron **OR** ondansetron, oxyCODONE, potassium chloride, potassium chloride with lidocaine, potassium chloride, potassium chloride, potassium chloride, potassium phosphate (KPHOS) in D5W IV, potassium phosphate (KPHOS) in D5W IV, potassium phosphate (KPHOS) in D5W IV, potassium phosphate (KPHOS) in D5W IV, potassium phosphate (KPHOS) in D5W IV, prochlorperazine **OR** prochlorperazine, BETA BLOCKER NOT PRESCRIBED, sodium chloride (PF), venlafaxine          Physical Exam:   Vitals were reviewed  Blood pressure 100/63, pulse 75, temperature 98.2  F (36.8  C), temperature source Oral, resp. rate 16, weight 69.7 kg (153 lb 10.6 oz), SpO2 97 %.  Rhythm: NSR    Lungs: Diminished bases    Cardiovascular: RRR normal s1 and s2    Abdomen: soft NTND    Extremeties: minimal edema    Incision: CDI    CT: to water seal    Weight:   Vitals:    06/19/20 0608 06/20/20 0515 06/20/20 1607 06/21/20 0610   Weight: 68 kg (150 lb) 70.8 kg (156 lb 1.4 oz) 70.7 kg (155 lb 13.8 oz) 70 kg (154 lb 5.2 oz)    06/22/20 0632   Weight: 69.7 kg (153 lb 10.6 oz)            Data:   Labs:   Lab Results   Component Value Date    WBC 8.2 06/21/2020     Lab Results   Component Value Date    RBC 3.65 06/21/2020     Lab Results   Component Value Date    HGB 11.3 06/21/2020     Lab Results   Component Value Date    HCT  33.8 06/21/2020     No components found for: MCT  Lab Results   Component Value Date    MCV 93 06/21/2020     Lab Results   Component Value Date    MCH 31.0 06/21/2020     Lab Results   Component Value Date    MCHC 33.4 06/21/2020     Lab Results   Component Value Date    RDW 12.7 06/21/2020     Lab Results   Component Value Date     06/21/2020       Last Basic Metabolic Panel:  Lab Results   Component Value Date     06/20/2020      Lab Results   Component Value Date    POTASSIUM 4.0 06/20/2020     Lab Results   Component Value Date    CHLORIDE 109 06/20/2020     Lab Results   Component Value Date    THIAGO 7.9 06/20/2020     Lab Results   Component Value Date    CO2 25 06/20/2020     Lab Results   Component Value Date    BUN 6 06/20/2020     Lab Results   Component Value Date    CR 0.68 06/20/2020     Lab Results   Component Value Date    GLC 96 06/20/2020       CXR: 6/20/2020    IMPRESSION: Heart is normal in size. Right IJ catheter SVC level. There are 2 right-sided chest tubes. Trace right apical pneumothorax measures 1 mm. Small amount right lower lung atelectasis with slight improvement. Left lung clear. Minimal subcutaneous   emphysema right lower lateral chest.    Alberta Fuller PA-C  CV Surgery  Pager # [phone]

## 2020-06-22 NOTE — PROGRESS NOTES
"NUTRITION ASSESSMENT      REASON FOR ASSESSMENT:  Cardiac Surgery Nutrition Consult    CURRENT DIET / INTAKE:  Diet: Regular    Chart reviewed  Pt tolerating po  Flowsheets reflect meal intake is %  Per meal order records, pt received 2577 cals and 108 gm pro yesterday via 3 meals    Have attempted to reach pt via phone throughout the morning - no answer  Note that breakfast meal has not been ordered yet    ANTHROPOMETRICS:   Ht: 5'11\"  Wt: (6/22) 69.7 kg  BMI: 21.4  IBW: 70.4 kg  Weight Status: Normal BMI  %IBW: 99%    MALNUTRITION:  Patient does not meet two of the following criteria necessary for diagnosing malnutrition:  significant weight loss, reduced intake, subcutaneous fat loss, muscle loss or fluid retention. Nutrition Focused Physical Assessment (NFPA) not appropriate at this time.    NUTRITION DIAGNOSIS:   No nutrition diagnosis at this time    INTERVENTIONS:    Nutrition Prescription:  Regular diet    Implementation:  Will send a PLUS2 protein drink at 10am and 2pm for added protein    Goals:  Patient to consume ~75% at meals     Follow Up/Monitoring (InPatient):  Food and Fluid intake -  Monitor for adequacy    Follow Up/Monitoring (OutPatient):  Patient will participate in out-patient cardiac rehab and attend nutrition classes during the program    "

## 2020-06-22 NOTE — PLAN OF CARE
Patient is alert and orientated X 4, up with stand by assist, vital signs stable. Pain managed with oxycodone, robaxin and tylenol. CT x 2 to water seal with serosangeous drainage, air leak present.  Dressing CDI, pacers capped. + gas, voided well, no BM.  Tele: .

## 2020-06-22 NOTE — PLAN OF CARE
"Discharge Planner PT   Patient plan for discharge: Home with OP CR in [place].     Current status: Provided CR education and thoracotomy exercises, reviewed and discussed activity progression. Pt with preference to hold on activity as she states \"This tube is coming out this morning!\"    PM: Ambulated 10 min in the valdez, negotiated 10 stairs must use a step to pattern leading up with the L as the R groin is very sore. Unable to lift the R leg up onto the step. Otherwise, pt moving well, improving motion of the R UE, improved breathing and pace with activity. VSS.    Barriers to return to prior living situation: None anticipated.  Recommendations for discharge: Home with OP CR phase II  Rationale for recommendations: Pt will benefit from continued skilled rehab services for continued progression of exercise and activity with close monitoring, as well as to continue education for optimal heart health.        Entered by: Soheila Sawant 06/22/2020 9:25 AM       "

## 2020-06-23 ENCOUNTER — APPOINTMENT (OUTPATIENT)
Dept: PHYSICAL THERAPY | Facility: CLINIC | Age: 35
End: 2020-06-23
Attending: SURGERY
Payer: COMMERCIAL

## 2020-06-23 ENCOUNTER — APPOINTMENT (OUTPATIENT)
Dept: GENERAL RADIOLOGY | Facility: CLINIC | Age: 35
End: 2020-06-23
Attending: SURGERY
Payer: COMMERCIAL

## 2020-06-23 VITALS
RESPIRATION RATE: 18 BRPM | WEIGHT: 154.32 LBS | HEART RATE: 92 BPM | SYSTOLIC BLOOD PRESSURE: 107 MMHG | DIASTOLIC BLOOD PRESSURE: 72 MMHG | BODY MASS INDEX: 21.52 KG/M2 | TEMPERATURE: 98.4 F | OXYGEN SATURATION: 98 %

## 2020-06-23 PROBLEM — E87.70 FLUID OVERLOAD: Status: ACTIVE | Noted: 2020-06-23

## 2020-06-23 PROBLEM — D62 ANEMIA DUE TO BLOOD LOSS, ACUTE: Status: ACTIVE | Noted: 2020-06-23

## 2020-06-23 LAB
GLUCOSE BLDC GLUCOMTR-MCNC: 88 MG/DL (ref 70–99)
GLUCOSE BLDC GLUCOMTR-MCNC: 95 MG/DL (ref 70–99)
INTERPRETATION ECG - MUSE: NORMAL
INTERPRETATION ECG - MUSE: NORMAL
PLATELET # BLD AUTO: 204 10E9/L (ref 150–450)
POTASSIUM SERPL-SCNC: 3.7 MMOL/L (ref 3.4–5.3)

## 2020-06-23 PROCEDURE — 25000128 H RX IP 250 OP 636: Performed by: PHYSICIAN ASSISTANT

## 2020-06-23 PROCEDURE — 00000401 ZZHCL STATISTIC THROMBIN TIME NC: Performed by: NURSE PRACTITIONER

## 2020-06-23 PROCEDURE — 86146 BETA-2 GLYCOPROTEIN ANTIBODY: CPT | Performed by: NURSE PRACTITIONER

## 2020-06-23 PROCEDURE — 25000132 ZZH RX MED GY IP 250 OP 250 PS 637: Performed by: SURGERY

## 2020-06-23 PROCEDURE — 00000167 ZZHCL STATISTIC INR NC: Performed by: NURSE PRACTITIONER

## 2020-06-23 PROCEDURE — 85613 RUSSELL VIPER VENOM DILUTED: CPT | Performed by: NURSE PRACTITIONER

## 2020-06-23 PROCEDURE — 85730 THROMBOPLASTIN TIME PARTIAL: CPT | Performed by: NURSE PRACTITIONER

## 2020-06-23 PROCEDURE — 00000146 ZZHCL STATISTIC GLUCOSE BY METER IP

## 2020-06-23 PROCEDURE — 25000132 ZZH RX MED GY IP 250 OP 250 PS 637: Performed by: PHYSICIAN ASSISTANT

## 2020-06-23 PROCEDURE — 86147 CARDIOLIPIN ANTIBODY EA IG: CPT | Performed by: NURSE PRACTITIONER

## 2020-06-23 PROCEDURE — 85049 AUTOMATED PLATELET COUNT: CPT | Performed by: PHYSICIAN ASSISTANT

## 2020-06-23 PROCEDURE — 36415 COLL VENOUS BLD VENIPUNCTURE: CPT | Performed by: PHYSICIAN ASSISTANT

## 2020-06-23 PROCEDURE — 84132 ASSAY OF SERUM POTASSIUM: CPT | Performed by: NURSE PRACTITIONER

## 2020-06-23 PROCEDURE — 71046 X-RAY EXAM CHEST 2 VIEWS: CPT

## 2020-06-23 PROCEDURE — 97110 THERAPEUTIC EXERCISES: CPT | Mod: GP | Performed by: PHYSICAL THERAPIST

## 2020-06-23 PROCEDURE — 97530 THERAPEUTIC ACTIVITIES: CPT | Mod: GP | Performed by: PHYSICAL THERAPIST

## 2020-06-23 PROCEDURE — 36415 COLL VENOUS BLD VENIPUNCTURE: CPT | Performed by: NURSE PRACTITIONER

## 2020-06-23 RX ORDER — METHOCARBAMOL 750 MG/1
750 TABLET, FILM COATED ORAL 4 TIMES DAILY PRN
Qty: 60 TABLET | Refills: 0 | Status: SHIPPED | OUTPATIENT
Start: 2020-06-23 | End: 2024-08-22

## 2020-06-23 RX ORDER — AMOXICILLIN 250 MG
1 CAPSULE ORAL 2 TIMES DAILY PRN
Qty: 30 TABLET | Refills: 0 | Status: SHIPPED | OUTPATIENT
Start: 2020-06-23 | End: 2020-08-19

## 2020-06-23 RX ORDER — POLYETHYLENE GLYCOL 3350 17 G/17G
17 POWDER, FOR SOLUTION ORAL DAILY PRN
Qty: 14 PACKET | Refills: 0 | Status: SHIPPED | OUTPATIENT
Start: 2020-06-23 | End: 2020-08-19

## 2020-06-23 RX ORDER — PANTOPRAZOLE SODIUM 40 MG/1
40 TABLET, DELAYED RELEASE ORAL
Qty: 14 TABLET | Refills: 0 | Status: SHIPPED | OUTPATIENT
Start: 2020-06-24 | End: 2020-07-08

## 2020-06-23 RX ORDER — ACETAMINOPHEN 325 MG/1
650 TABLET ORAL EVERY 4 HOURS PRN
Qty: 1 BOTTLE | Refills: 0 | Status: SHIPPED | OUTPATIENT
Start: 2020-06-23 | End: 2020-08-19

## 2020-06-23 RX ORDER — OXYCODONE HYDROCHLORIDE 5 MG/1
5 TABLET ORAL EVERY 4 HOURS PRN
Qty: 50 TABLET | Refills: 0 | Status: SHIPPED | OUTPATIENT
Start: 2020-06-23 | End: 2024-08-22

## 2020-06-23 RX ORDER — COLCHICINE 0.6 MG/1
0.6 TABLET ORAL DAILY
Qty: 30 TABLET | Refills: 0 | Status: SHIPPED | OUTPATIENT
Start: 2020-06-24 | End: 2020-07-24

## 2020-06-23 RX ADMIN — ACETAMINOPHEN 650 MG: 325 TABLET, FILM COATED ORAL at 00:36

## 2020-06-23 RX ADMIN — ASPIRIN 325 MG: 325 TABLET, COATED ORAL at 09:08

## 2020-06-23 RX ADMIN — METHOCARBAMOL TABLETS 750 MG: 750 TABLET, COATED ORAL at 12:29

## 2020-06-23 RX ADMIN — POTASSIUM CHLORIDE 20 MEQ: 1500 TABLET, EXTENDED RELEASE ORAL at 12:29

## 2020-06-23 RX ADMIN — PANTOPRAZOLE SODIUM 40 MG: 40 TABLET, DELAYED RELEASE ORAL at 09:08

## 2020-06-23 RX ADMIN — COLCHICINE 0.6 MG: 0.6 TABLET, FILM COATED ORAL at 09:08

## 2020-06-23 RX ADMIN — OXYCODONE HYDROCHLORIDE 5 MG: 5 TABLET ORAL at 09:08

## 2020-06-23 RX ADMIN — ACETAMINOPHEN 650 MG: 325 TABLET, FILM COATED ORAL at 11:15

## 2020-06-23 RX ADMIN — HEPARIN SODIUM 5000 UNITS: 5000 INJECTION, SOLUTION INTRAVENOUS; SUBCUTANEOUS at 04:48

## 2020-06-23 RX ADMIN — OXYCODONE HYDROCHLORIDE 5 MG: 5 TABLET ORAL at 11:50

## 2020-06-23 RX ADMIN — OXYCODONE HYDROCHLORIDE 5 MG: 5 TABLET ORAL at 00:36

## 2020-06-23 ASSESSMENT — ACTIVITIES OF DAILY LIVING (ADL)
ADLS_ACUITY_SCORE: 12
ADLS_ACUITY_SCORE: 10

## 2020-06-23 NOTE — PROGRESS NOTES
CV Surgery    S: No acute events overnight. Saturating well on room air. Pain controlled. Tolerating diet. +BM. Chest tubes removed yesterday without issue.     O: B/P: 103/69, T: 98.5, P: 77, R: 18  General: NAD  Heart: RRR normal s1 and s2  Lungs: CTAB  Abd: soft NTND  Sternum: CDI  Extremities: minimal edema    Lab Results   Component Value Date    WBC 7.2 06/22/2020     Lab Results   Component Value Date    RBC 3.79 06/22/2020     Lab Results   Component Value Date    HGB 11.7 06/22/2020     Lab Results   Component Value Date    HCT 35.0 06/22/2020     No components found for: MCT  Lab Results   Component Value Date    MCV 92 06/22/2020     Lab Results   Component Value Date    MCH 30.9 06/22/2020     Lab Results   Component Value Date    MCHC 33.4 06/22/2020     Lab Results   Component Value Date    RDW 12.8 06/22/2020     Lab Results   Component Value Date     06/23/2020       Last Basic Metabolic Panel:  Lab Results   Component Value Date     06/22/2020      Lab Results   Component Value Date    POTASSIUM 3.7 06/23/2020     Lab Results   Component Value Date    CHLORIDE 107 06/22/2020     Lab Results   Component Value Date    THIAGO 8.8 06/22/2020     Lab Results   Component Value Date    CO2 26 06/22/2020     Lab Results   Component Value Date    BUN 8 06/22/2020     Lab Results   Component Value Date    CR 0.70 06/22/2020     Lab Results   Component Value Date    GLC 87 06/22/2020       A/P: POD# 4 s/p Right mini thoracotomy, PFO closure, right common femoral arterial and venous cannulation for cardiopulmonary bypass, intraoperative LEVON by Dr. Marguerite Kaplan.     CXR clear  Stable for discharge to home today    Alberta Fuller PA-C  Pager [phone]

## 2020-06-23 NOTE — PLAN OF CARE
A/Ox4. VSS ex soft bp's. Tele SR. LS clear. +Bs, +flatus, +bm. Voiding adequately. Incision  cdi/lola with liquid bandage. Old CT site covered with gauze, cdi. Managing pain with prn oxycodone, tylenol, and robaxin. Tolerating diet. Up independently. Possible discharge today.

## 2020-06-23 NOTE — PROGRESS NOTES
Vital signs stable on RA, baseline soft BP.  Tele NSR. A/Ox4. R side and groin incisions liquid bandage, WDL, some erythema. LS clear. Changed prior CT dressing, supplies sent. Small serous drainage. + gas, + BM. Up ad carmelo. IS to 1500. Pain controlled with oxycodone, robaxin, and tylenol.     AVS given and discussed with patient and . All questions answered. Stoplight tool emphasized. discharge rx eprescribed to patient's home tracey, paper copy oxycodone rx sent with patient. Spouse packed belongings. Wheeled out by staff.

## 2020-06-23 NOTE — DISCHARGE SUMMARY
Discharge Summary    Teri Call MRN# [id number]   YOB: 1985 Age: 34 year old     Date of Admission:  6/19/2020  Date of Discharge:  6/23/2020 12:51 PM  Admitting Physician:  Marguerite Kaplan MD  Discharge Physician:  Marguerite Kaplan MD  Discharging Service:  Cardiovascular and Thoracic Surgery     Home clinic:   [place] [address]*      Primary Phone: [phone] Primary Fax: [phone]       Primary Provider: Marcus Ngo          Admission Diagnoses:   Patent foramen ovale [Q21.1]          Discharge Diagnosis:   Patient Active Problem List   Diagnosis     Acute CVA (cerebrovascular accident) (H)     PFO (patent foramen ovale)     Pyelonephritis     Depression     S/P patent foramen ovale closure     Fluid overload     Anemia due to blood loss, acute                Discharge Disposition:   Discharged to home           Condition on Discharge:   Discharge condition: Stable   Discharge vitals: Blood pressure 107/72, pulse 92, temperature 98.4  F (36.9  C), temperature source Oral, resp. rate 18, weight 70 kg (154 lb 5.2 oz), SpO2 98 %.     Code status on discharge: Full Code           Procedures:   On 6/19/2020, Teri Call underwent successful Right mini thoracotomy, PFO closure, right common femoral arterial and venous cannulation for cardiopulmonary bypass, intraoperative LEVON by Dr. Marguerite Kaplan.           Medications Prior to Admission:     Medications Prior to Admission   Medication Sig Dispense Refill Last Dose     aspirin (ASA) 325 MG EC tablet Take 325 mg by mouth every morning   6/18/2020 at AM     LORazepam (ATIVAN) 0.5 MG tablet Take 0.5 mg by mouth daily as needed for anxiety    at PRN     venlafaxine (EFFEXOR-XR) 75 MG 24 hr capsule Take 75 mg by mouth 2 times daily as needed (Patient taking differently than prescribed : RX states 75mg twice daily)   6/18/2020 at Unknown time     [DISCONTINUED] naproxen sodium (ANAPROX) 220 MG  tablet Take 220-440 mg by mouth 2 times daily as needed    6/17/2020 at PRN             Discharge Medications:     Current Discharge Medication List      START taking these medications    Details   acetaminophen (TYLENOL) 325 MG tablet Take 2 tablets (650 mg) by mouth every 4 hours as needed for mild pain or fever  Qty: 1 Bottle, Refills: 0    Associated Diagnoses: S/P patent foramen ovale closure      colchicine (COLCYRS) 0.6 MG tablet Take 1 tablet (0.6 mg) by mouth daily  Qty: 30 tablet, Refills: 0    Associated Diagnoses: S/P patent foramen ovale closure      methocarbamol (ROBAXIN) 750 MG tablet Take 1 tablet (750 mg) by mouth 4 times daily as needed for muscle spasms  Qty: 60 tablet, Refills: 0    Associated Diagnoses: S/P patent foramen ovale closure      oxyCODONE (ROXICODONE) 5 MG tablet Take 1 tablet (5 mg) by mouth every 4 hours as needed for moderate to severe pain  Qty: 50 tablet, Refills: 0    Associated Diagnoses: S/P patent foramen ovale closure      pantoprazole (PROTONIX) 40 MG EC tablet Take 1 tablet (40 mg) by mouth every morning (before breakfast) for 14 days  Qty: 14 tablet, Refills: 0    Associated Diagnoses: S/P patent foramen ovale closure      polyethylene glycol (MIRALAX) 17 g packet Take 17 g by mouth daily as needed for constipation  Qty: 14 packet, Refills: 0    Associated Diagnoses: S/P patent foramen ovale closure      senna-docusate (SENOKOT-S/PERICOLACE) 8.6-50 MG tablet Take 1 tablet by mouth 2 times daily as needed for constipation  Qty: 30 tablet, Refills: 0    Associated Diagnoses: S/P patent foramen ovale closure         CONTINUE these medications which have NOT CHANGED    Details   aspirin (ASA) 325 MG EC tablet Take 325 mg by mouth every morning      LORazepam (ATIVAN) 0.5 MG tablet Take 0.5 mg by mouth daily as needed for anxiety      venlafaxine (EFFEXOR-XR) 75 MG 24 hr capsule Take 75 mg by mouth 2 times daily as needed (Patient taking differently than prescribed : RX  states 75mg twice daily)         STOP taking these medications       naproxen sodium (ANAPROX) 220 MG tablet Comments:   Reason for Stopping:                     Consultations:   Nutrition, Intensivist             Brief History of Illness:   Ms. Call is an otherwise healthy and active 34-year-old female with no significant past medical history who suffered an acute cryptogenic stroke in April involving the right occipital lobe.  Extensive workup demonstrated a PFO with likely 2 separate holes.  Her septum was quite aneurysmal.  Given these findings, Dr. Machado did not feel that she would be a good percutaneous device closure candidate.  The patient was therefore referred to me recently for minimally-invasive surgical PFO closure.  She was taken to the operating room today for PFO closure via a right mini thoracotomy approach.           Hospital Course:   On 6/19/2020, Teri Call underwent successful Right mini thoracotomy, PFO closure, right common femoral arterial and venous cannulation for cardiopulmonary bypass, intraoperative LEVON by Dr. Marguerite Kaplan.      She was extubated within 24 hours of surgery per protocol and once weaned from hemodynamic stabilizing infusions, she was transferred to the surgical telemetry floor.     She initially had high output from her chest tubes and was started on colchicine for pericarditis prophylaxis. She is discharged on a 30 day supply and will not need further refills of this medication. Her chest tubes and pacer wires were removed on POD#3 and repeat CXR on day of discharge is stable without pneumothorax or pleural effusion.    She was fluid overloaded and treated with IV diuretics once. At discharge, she is about 2kg from preoperative weight but is autodiuresing well. Will not discharge with additional diuretics, patient is encouraged to weigh herself daily.    She has been working well with therapies and is stable for discharge to home on POD#4. Patient was not  discharged on a beta-blocker because her blood pressure would not support addition of this medication. Follow up with cardiac surgery and cardiology have been arranged. Patient is encouraged to follow up with PCP and cardiac rehab upon discharge.              Significant Results:   Lab Results   Component Value Date    WBC 7.2 06/22/2020     Lab Results   Component Value Date    RBC 3.79 06/22/2020     Lab Results   Component Value Date    HGB 11.7 06/22/2020     Lab Results   Component Value Date    HCT 35.0 06/22/2020     No components found for: MCT  Lab Results   Component Value Date    MCV 92 06/22/2020     Lab Results   Component Value Date    MCH 30.9 06/22/2020     Lab Results   Component Value Date    MCHC 33.4 06/22/2020     Lab Results   Component Value Date    RDW 12.8 06/22/2020     Lab Results   Component Value Date     06/23/2020       Last Basic Metabolic Panel:  Lab Results   Component Value Date     06/22/2020      Lab Results   Component Value Date    POTASSIUM 3.7 06/23/2020     Lab Results   Component Value Date    CHLORIDE 107 06/22/2020     Lab Results   Component Value Date    THIAGO 8.8 06/22/2020     Lab Results   Component Value Date    CO2 26 06/22/2020     Lab Results   Component Value Date    BUN 8 06/22/2020     Lab Results   Component Value Date    CR 0.70 06/22/2020     Lab Results   Component Value Date    GLC 87 06/22/2020                  Pending Results:   None           Discharge Instructions and Follow-Up:   Discharge diet: Regular   Discharge activity: Daily weights: Call if weight gain 2-3 lbs over 24 hours or if greater than 5 lbs in 1 week.  No lifting more than 10 lbs for 8-12 weeks.  No driving for 1 month.  Call for pain medication refill.  Call for temperature greater than 101.0  Daily shower with antibacterial soap.   Discharge follow-up: Follow-up Appointments     Follow-up Appointments   Follow-up and recommended labs and tests      *Follow up primary care  provider in 7-10 days after discharge in order to review your medication, vital signs, obtain any necessary lab work your doctor may want, and to update them on your hospitalization and medical issues.   *Follow up with Georgette Lopez with Dr Kaplan, heart surgeon, on 7/8/2020 at 2:00pm, this will be a telephone visit unless you have incision concerns. If you want to be seen in person you will be seen at [facility] at [facility] [address]. If any questions or concerns call [phone].  You will see us once at this visit and then if everything is going well you will not need to see us again.  You will follow long term with your cardiologist.   *Follow up with Dr Machado, cardiologist, on 8/19/20 at 12:45pm. This is who you will follow with long term about your heart issues. [phone].   Follow up with cardiac rehab within 5 days of discharge- call call [phone] to schedule            Outpatient therapy: Cardiac rehab   Home Care agency: None    Supplies and equipment: None   Lines and drains: None    Wound care: Wash incision daily with antibacterial soap   Other instructions: None

## 2020-06-23 NOTE — PLAN OF CARE
"Discharge Planner PT   Patient plan for discharge: Home today, OP CR phase II    Current status: Attempted TM ambulation, pt tolerated only 6 minutes secondary to significant \"groin pain\" on the R. States she gets a \"catch\" in it, she is unable to lift it up onto a step, or take a full step length on the R. Pain is worse today. Mentioned to RN wondering if more pain as she is taking less pain meds? Pt states the pain was there when she woke up initially, but that it began to get worse yesterday. Definitely limiting activity tolerance today.    Stable CV response with activity. Rates \"effort\" at only a 1/10 walking, but that the R hip/groin pain is \"too much.\"    Barriers to return to prior living situation: None based on mobility  Recommendations for discharge: Home with OP CR phase II, pt states she will go some[place] to [place].    Rationale for recommendations: Pt will benefit from continued skilled rehab services to closely monitor and progress exercise and activity tolerance as well as continue education for optimal heart health.         Entered by: Soheila Sawant 06/23/2020 11:10 AM       "

## 2020-06-24 LAB — LA PPP-IMP: NEGATIVE

## 2020-06-25 ENCOUNTER — TELEPHONE (OUTPATIENT)
Dept: OTHER | Facility: CLINIC | Age: 35
End: 2020-06-25

## 2020-06-25 LAB
B2 GLYCOPROT1 IGG SERPL IA-ACNC: <0.6 U/ML
B2 GLYCOPROT1 IGM SERPL IA-ACNC: <2.9 U/ML
CARDIOLIPIN ANTIBODY IGG: <1.6 GPL-U/ML (ref 0–19.9)
CARDIOLIPIN ANTIBODY IGM: 0.6 MPL-U/ML (ref 0–19.9)

## 2020-08-19 ENCOUNTER — VIRTUAL VISIT (OUTPATIENT)
Dept: CARDIOLOGY | Facility: CLINIC | Age: 35
End: 2020-08-19
Payer: COMMERCIAL

## 2020-08-19 DIAGNOSIS — Z87.74 S/P PATENT FORAMEN OVALE CLOSURE: Primary | ICD-10-CM

## 2020-08-19 PROCEDURE — 99213 OFFICE O/P EST LOW 20 MIN: CPT | Mod: 95 | Performed by: INTERNAL MEDICINE

## 2020-08-19 NOTE — PROGRESS NOTES
Service Date: 08/19/2020      VIRTUAL TELEPHONE VISIT       HISTORY OF PRESENT ILLNESS:  This is a telephone virtual visit from 12:59 to 1:11 p.m. for a total time of 12 minutes on patient, Teri Call.  She is a 35-year-old woman who was referred in to me for stroke and a PFO.  I did a transesophageal echo and was concerned this was not a standard PFO and indeed, it was not.  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  From the surgeon's note, it is not clear if this is actually a sinus venosus IVC ASD which is quite rare or simply an ASD close to the IVC but not involving the junction.  In any case, she had surgery.  It went well and it was closed in 2 different areas.  It was done as a mini surgery so they did not have to cut through the sternum.  She does note tenderness and numbness in between the ribs.  I told her this is classic for this surgery.  The surgery from that approach is a little more painful but heals much faster and more stable.  I told her it can take up to 6 months to see full improvement and there still may be some areas of hypersensitivity next to areas of hyposensitivity.  She has not had any atrial fibrillation or other problems.  She does note a bit of feeling like she is short of breath sometimes.  I was going to plan on a chest x-ray and echo anyway.  I am going to go ahead and move that up and we will do it in about 2 weeks.  The echocardiogram is to make sure that there is no pericardial effusion and I will do a bubble study to make sure that there is full closure.  The chest x-ray will be done to make sure there is not an air leak.  There was a tiny right pneumothorax post surgery when she had the drainage tubes still in.  We will follow up on that.  I did talk to her about the fact that she should always watch for future atrial fibrillation.  None was seen that we know of but we will be seeing her in the office in  2 weeks.  Also, since I last saw her, the hypercoagulable panel came back and it was negative which is reassuring.  If her followup visit in 2 weeks does not show any problems, her echo and chest x-ray are clear, we would not need to see her further unless something else pops up.      This was a 12-minute telephone conference.      Gomez Gordon MD       cc:   Marcus Ngo MD    [facility]    [address] [place] [address]      Marguerite Kaplan MD    [facility] Cardiothoracic Surgery    [address] [facility] [address]   [place] [address]         GOMEZ GORDON MD             D: 2020   T: 2020   MT: GAVIN      Name:     PANDA JOHN   MRN:      [id number]        Account:      [id number]   :      1985           Service Date: 2020      Document: [id number]

## 2020-08-19 NOTE — LETTER
8/19/2020    Marcus Ngo  [facility] [address] [facility] [address]    RE: Teri Call       Dear Colleague,    I had the pleasure of seeing Teri Call in the [facility].    Teri Call is a 35 year old female who is being evaluated via a billable telephone visit.      Service Date: 08/19/2020      VIRTUAL TELEPHONE VISIT       HISTORY OF PRESENT ILLNESS:  This is a telephone virtual visit from 12:59 to 1:11 p.m. for a total time of 12 minutes on patient, Teri Call.  She is a 35-year-old woman who was referred in to me for stroke and a PFO.  I did a transesophageal echo and was concerned this was not a standard PFO and indeed, it was not.  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  From the surgeon's note, it is not clear if this is actually a sinus venosus IVC ASD which is quite rare or simply an ASD close to the IVC but not involving the junction.  In any case, she had surgery.  It went well and it was closed in 2 different areas.  It was done as a mini surgery so they did not have to cut through the sternum.  She does note tenderness and numbness in between the ribs.  I told her this is classic for this surgery.  The surgery from that approach is a little more painful but heals much faster and more stable.  I told her it can take up to 6 months to see full improvement and there still may be some areas of hypersensitivity next to areas of hyposensitivity.  She has not had any atrial fibrillation or other problems.  She does note a bit of feeling like she is short of breath sometimes.  I was going to plan on a chest x-ray and echo anyway.  I am going to go ahead and move that up and we will do it in about 2 weeks.  The echocardiogram is to make sure that there is no pericardial effusion and I will do a bubble study to make sure that there is full closure.  The chest x-ray will be  done to make sure there is not an air leak.  There was a tiny right pneumothorax post surgery when she had the drainage tubes still in.  We will follow up on that.  I did talk to her about the fact that she should always watch for future atrial fibrillation.  None was seen that we know of but we will be seeing her in the office in 2 weeks.  Also, since I last saw her, the hypercoagulable panel came back and it was negative which is reassuring.  If her followup visit in 2 weeks does not show any problems, her echo and chest x-ray are clear, we would not need to see her further unless something else pops up.      This was a 12-minute telephone conference.      Gomez Gordon MD       cc:   Marcus Ngo MD    [facility]    [address] [place] [address]      Marguerite Kaplan MD    [facility] Cardiothoracic Surgery    [address] [facility] [address] [facility] [address]   [place] [address]         GOMEZ GORDON MD         D: 2020   T: 2020   MT: DW      Name:     PANDA JOHN   MRN:      [id number]        Account:      [id number]   :      1985           Service Date: 2020      Document: [id number]       Thank you for allowing me to participate in the care of your patient.    Sincerely,     Gomez Gordon MD     [facility]

## 2020-08-19 NOTE — PROGRESS NOTES
"Teri Call is a 35 year old female who is being evaluated via a billable telephone visit.      The patient has been notified of following:     \"This telephone visit will be conducted via a call between you and your physician/provider. We have found that certain health care needs can be provided without the need for a physical exam.  This service lets us provide the care you need with a short phone conversation.  If a prescription is necessary we can send it directly to your pharmacy.  If lab work is needed we can place an order for that and you can then stop by our lab to have the test done at a later time.    Telephone visits are billed at different rates depending on your insurance coverage. During this emergency period, for some insurers they may be billed the same as an in-person visit.  Please reach out to your insurance provider with any questions.    If during the course of the call the physician/provider feels a telephone visit is not appropriate, you will not be charged for this service.\"    Patient has given verbal consent for Telephone visit?  Yes    What phone number would you like to be contacted at? [phone]    How would you like to obtain your AVS? MyChart     Vitals - Patient Reported  Systolic (Patient Reported): 95  Diastolic (Patient Reported): 71  Weight (Patient Reported): 71.2 kg (157 lb)  Height (Patient Reported): 180.3 cm (5' 11\")  BMI (Based on Pt Reported Ht/Wt): 21.9  Pulse (Patient Reported): 82    Review Of Systems  Skin: NEGATIVE  Eyes:Ears/Nose/Throat: NEGATIVE  Respiratory: can't catch breath when lying down  Cardiovascular:NEGATIVE  Gastrointestinal:  Heart burn, indigestion  Genitourinary:NEGATIVE   Musculoskeletal: NEGATIVE  Neurologic: NEGATIVE  Psychiatric: NEGATIVE  Hematologic/Lymphatic/Immunologic: NEGATIVE  Endocrine:  NEGATIVE    Emily Cabrera LPN    Phone call duration: 12 minutes    chu"

## 2020-09-02 ENCOUNTER — TELEPHONE (OUTPATIENT)
Dept: CARDIOLOGY | Facility: CLINIC | Age: 35
End: 2020-09-02

## 2020-09-03 ENCOUNTER — HOSPITAL ENCOUNTER (OUTPATIENT)
Dept: GENERAL RADIOLOGY | Facility: CLINIC | Age: 35
End: 2020-09-03
Attending: INTERNAL MEDICINE
Payer: COMMERCIAL

## 2020-09-03 ENCOUNTER — HOSPITAL ENCOUNTER (OUTPATIENT)
Dept: CARDIOLOGY | Facility: CLINIC | Age: 35
End: 2020-09-03
Attending: INTERNAL MEDICINE
Payer: COMMERCIAL

## 2020-09-03 ENCOUNTER — TELEPHONE (OUTPATIENT)
Dept: CARDIOLOGY | Facility: CLINIC | Age: 35
End: 2020-09-03

## 2020-09-03 DIAGNOSIS — Z87.74 S/P PATENT FORAMEN OVALE CLOSURE: ICD-10-CM

## 2020-09-03 PROCEDURE — 93306 TTE W/DOPPLER COMPLETE: CPT | Mod: 26 | Performed by: INTERNAL MEDICINE

## 2020-09-03 PROCEDURE — 93306 TTE W/DOPPLER COMPLETE: CPT

## 2020-09-03 PROCEDURE — 71046 X-RAY EXAM CHEST 2 VIEWS: CPT

## 2020-09-03 NOTE — TELEPHONE ENCOUNTER
PATIENT WELLNESS TELEPHONE SCREENING     Step 1 Screening Questions    In the past 3 weeks, have you been exposed to someone with a suspected or known illness?  COVID-19? No  Chickenpox? No   Measles? No  Pertussis? No    Do you have one of the following NEW symptoms?   Fever/Chills? No   Cough? No   Shortness of breath? No   New loss of taste or smell? No  Sore throat? No  Muscle or body aches? No  Headaches? No  Fatigue? No  Vomiting or diarrhea? No    Have you had a positive COVID-19 diagnostic test (nasal swab test) in the last 14 days or are you currently on self-quarantine restrictions (i.e. travel restrictions, exposures, etc.)?No    Step 2 Screening Results (Skip if the patient is negative for symptoms), If Yes:    Due to your current symptoms, for our safety we need to cancel your appointment. We are advising that you consult with your ordering provider or [facility] at [phone].    Step 3 Review Visitor Policy  Patient informed of the updated visitor policy   1 visitor allowed per patient   Visitor must screen negative for COVID symptoms   Visitor must wear a mask

## 2020-09-09 ENCOUNTER — VIRTUAL VISIT (OUTPATIENT)
Dept: CARDIOLOGY | Facility: CLINIC | Age: 35
End: 2020-09-09
Payer: COMMERCIAL

## 2020-09-09 DIAGNOSIS — Q21.12 PFO (PATENT FORAMEN OVALE): Primary | ICD-10-CM

## 2020-09-09 PROCEDURE — 99213 OFFICE O/P EST LOW 20 MIN: CPT | Mod: 95 | Performed by: INTERNAL MEDICINE

## 2020-09-09 NOTE — PROGRESS NOTES
Service Date: 09/09/2020      VIDEO VISIT      This is a brief note this is [employer] video office visit from 08:38 a.m. to 8:46 a.m.        Teri Call is a delightful 35-year-old woman.  Her history was previously recounted.  Briefly, she had a CVA and the only identifiable cause was a PFO.  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  On the surgeon's note, they did find it was near the IVC, but not clearly a sinus venosus IVC type ASD.  Those are quite rare.  The surgeon was able to close it through a lateral thoracotomy with good result.        She was having some shortness of breath.  I am happy to report her chest x-ray shows no pneumothorax and lung fields are clear.  The echocardiogram, including a bubble study is now completely back to normal.  No shunts.  No effusion, normal heart size.        The patient himself states that she is feeling well.  She does note occasionally she has to take a deep breath at rest, but she can exercise without difficulty and she is still having a little bit of intercostal pain which is not unexpected.        At this point, everything is fine.  She is doing well.  She has asked me to clear her to go back to work.  She does work as a  with some intermittent physical labor.  She is cleared to return to work without restriction on 09/14/2020.        At this time, I have not scheduled any further followup visits.  I did suggest that we perhaps have her come back in 5 years for repeat echocardiogram and she is certainly welcome to call us before that time.  I did mention that the atrial arrhythmias are sometimes common with atrial septal defect even after they have been repaired and to watch out for those.        cc:      Marcus Ngo MD   [facility]   [address] [place] [address]       Teri Call   [address] [place] [address]         ROXANNA GORDON MD             D: 09/09/2020   T: 09/09/2020   MT: ARLYN       Name:     PANDA JOHN   MRN:      [id number]        Account:      [id number]   :      1985           Service Date: 2020      Document: [id number]

## 2020-09-09 NOTE — PROGRESS NOTES
"Teri Call is a 35 year old female who is being evaluated via a billable video visit.      The patient has been notified of following:     \"This video visit will be conducted via a call between you and your physician/provider. We have found that certain health care needs can be provided without the need for an in-person physical exam.  This service lets us provide the care you need with a video conversation.  If a prescription is necessary we can send it directly to your pharmacy.  If lab work is needed we can place an order for that and you can then stop by our lab to have the test done at a later time.    Video visits are billed at different rates depending on your insurance coverage.  Please reach out to your insurance provider with any questions.    If during the course of the call the physician/provider feels a video visit is not appropriate, you will not be charged for this service.\"    Patient has given verbal consent for Video visit? Yes  How would you like to obtain your AVS? Mail a copy  If you are dropped from the video visit, the video invite should be resent to: Text to cell phone: [phone] Joshua Polanco  Will anyone else be joining your video visit? No       Review Of Systems  Skin: Negative  Eyes: Negative  Ears/Nose/Throat: Negative  Respiratory: Negative  Cardiovascular: Negative  Gastrointestinal: Negative  Genitourinary: Negative  Musculoskeletal: Negative  Neurologic: Negative  Psychiatric: Positive for sleep distubances  Hematologic/Lymphatic/Immunologic: Negative  Endocrine: Negative    Patient reported vitals:  BP: 102/71  Heart rate: 83  Weight: 163 lbs    Astrid Payne CMA      Video-Visit Details    Type of service:  Video Visit    Video Start Time:838am  Video End Time: 846am  Originating Location (pt. Location): Home    Distant Location (provider location):  [facility]     Platform used for Video Visit: Joshua sage"

## 2020-09-09 NOTE — LETTER
"9/9/2020    [facility] [address] [facility] [address]    RE: Teri Call       Dear Colleague,    I had the pleasure of seeing Teri Call in the [facility].    Teri Call is a 35 year old female who is being evaluated via a billable video visit.      The patient has been notified of following:     \"This video visit will be conducted via a call between you and your physician/provider. We have found that certain health care needs can be provided without the need for an in-person physical exam.  This service lets us provide the care you need with a video conversation.  If a prescription is necessary we can send it directly to your pharmacy.  If lab work is needed we can place an order for that and you can then stop by our lab to have the test done at a later time.    Video visits are billed at different rates depending on your insurance coverage.  Please reach out to your insurance provider with any questions.    If during the course of the call the physician/provider feels a video visit is not appropriate, you will not be charged for this service.\"    Patient has given verbal consent for Video visit? Yes  How would you like to obtain your AVS? Mail a copy  If you are dropped from the video visit, the video invite should be resent to: Text to cell phone: [phone] [employer] Text  Will anyone else be joining your video visit? No       Review Of Systems  Skin: Negative  Eyes: Negative  Ears/Nose/Throat: Negative  Respiratory: Negative  Cardiovascular: Negative  Gastrointestinal: Negative  Genitourinary: Negative  Musculoskeletal: Negative  Neurologic: Negative  Psychiatric: Positive for sleep distubances  Hematologic/Lymphatic/Immunologic: Negative  Endocrine: Negative    Patient reported vitals:  BP: 102/71  Heart rate: 83  Weight: 163 lbs    Astrid Payne CMA      Video-Visit Details    Type of service:  Video Visit    Video Start " Time:838am  Video End Time: 846am  Originating Location (pt. Location): Home    Distant Location (provider location):  [facility]     Platform used for Video Visit: TristaWell  chu        Service Date: 09/09/2020      VIDEO VISIT      This is a brief note this is Dorita video office visit from 08:38 a.m. to 8:46 a.m.        Teri Call is a delightful 35-year-old woman.  Her history was previously recounted.  Briefly, she had a CVA and the only identifiable cause was a PFO.  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  On the surgeon's note, they did find it was near the IVC, but not clearly a sinus venosus IVC type ASD.  Those are quite rare.  The surgeon was able to close it through a lateral thoracotomy with good result.        She was having some shortness of breath.  I am happy to report her chest x-ray shows no pneumothorax and lung fields are clear.  The echocardiogram, including a bubble study is now completely back to normal.  No shunts.  No effusion, normal heart size.        The patient himself states that she is feeling well.  She does note occasionally she has to take a deep breath at rest, but she can exercise without difficulty and she is still having a little bit of intercostal pain which is not unexpected.        At this point, everything is fine.  She is doing well.  She has asked me to clear her to go back to work.  She does work as a  with some intermittent physical labor.  She is cleared to return to work without restriction on 09/14/2020.        At this time, I have not scheduled any further followup visits.  I did suggest that we perhaps have her come back in 5 years for repeat echocardiogram and she is certainly welcome to call us before that time.  I did mention that the atrial arrhythmias are sometimes common with atrial septal defect even after they have been repaired and to watch out for those.        cc:      Marcus  MD Huber   [facility]   [address] [place] [address]       Teri Call   [address] [place] [address]         ROXANNA MACHADO MD         D: 2020   T: 2020   MT: ARLYN      Name:     TERI CALL   MRN:      [id number]        Account:      [id number]   :      1985           Service Date: 2020      Document: [id number]       Thank you for allowing me to participate in the care of your patient.    Sincerely,     Roxanna Machado MD     [facility]

## 2021-01-04 ENCOUNTER — HEALTH MAINTENANCE LETTER (OUTPATIENT)
Age: 36
End: 2021-01-04

## 2021-03-07 ENCOUNTER — HEALTH MAINTENANCE LETTER (OUTPATIENT)
Age: 36
End: 2021-03-07

## 2021-03-19 ENCOUNTER — TELEPHONE (OUTPATIENT)
Dept: NEUROLOGY | Facility: CLINIC | Age: 36
End: 2021-03-19

## 2021-03-23 ENCOUNTER — TELEPHONE (OUTPATIENT)
Dept: NEUROLOGY | Facility: CLINIC | Age: 36
End: 2021-03-23

## 2021-10-10 ENCOUNTER — HEALTH MAINTENANCE LETTER (OUTPATIENT)
Age: 36
End: 2021-10-10

## 2022-01-30 ENCOUNTER — HEALTH MAINTENANCE LETTER (OUTPATIENT)
Age: 37
End: 2022-01-30

## 2022-09-24 ENCOUNTER — HEALTH MAINTENANCE LETTER (OUTPATIENT)
Age: 37
End: 2022-09-24

## 2023-05-08 ENCOUNTER — HEALTH MAINTENANCE LETTER (OUTPATIENT)
Age: 38
End: 2023-05-08

## 2023-05-30 NOTE — PLAN OF CARE
Patient is alert and orientated X 4, up with stand by assist, vital signs stable. Pain managed with oxycodone, robaxin and tylenol. CT x 2 serosangeous drainage, air leak.  Dressing CDI, pacers capped. + gas, voided well.    done

## 2024-08-12 ENCOUNTER — MEDICAL CORRESPONDENCE (OUTPATIENT)
Dept: HEALTH INFORMATION MANAGEMENT | Facility: CLINIC | Age: 39
End: 2024-08-12
Payer: COMMERCIAL

## 2024-08-12 ENCOUNTER — TRANSFERRED RECORDS (OUTPATIENT)
Dept: HEALTH INFORMATION MANAGEMENT | Facility: CLINIC | Age: 39
End: 2024-08-12
Payer: COMMERCIAL

## 2024-08-14 ENCOUNTER — TELEPHONE (OUTPATIENT)
Dept: OTHER | Facility: CLINIC | Age: 39
End: 2024-08-14
Payer: COMMERCIAL

## 2024-08-14 NOTE — TELEPHONE ENCOUNTER
Referral received via Fax on 8/13/24.    Pt referred to [facility] by Magda Murray PA-C ([facility]) for muscle biopsy.    Routing to scheduling to coordinate the following:  NEW VASCULAR PATIENT VIDEO consult with Dr. Julian (video consult ok per scheduling guidelines for this diagnosis/referral)  Please schedule this at next available    Appt note:  Ref by Magda Murray PA-C ([facility]) for muscle biopsy.    LJ LaureanoN, RN, [facility] [place]       98

## 2024-08-14 NOTE — TELEPHONE ENCOUNTER
Left voicemail for patient to call back to schedule appointment(s), provided telephone number for patient to call back to schedule.    NEW VASCULAR PATIENT VIDEO consult with Dr. Julian (video consult ok per scheduling guidelines for this diagnosis/referral)  Please schedule this at next available     Appt note:  Ref by Magda Murray PA-C ([facility]) for muscle biopsy.

## 2024-08-21 NOTE — PROGRESS NOTES
[place] 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  39-year-old patient in overall good health except for PFO issue.  Approximately 6 months ago she developed spontaneous neuropathic pain in her legs and feet somewhat more on the left than right side.  Legs have given out on her several times with no warning.  She has [facility]  They have tried multiple medications and presently on Lyrica which is not overly helpful.  Due to this muscle biopsy was requested By Magda Murray PA-C     [facility]:  Medications: Trileptal, Lyrica, Cymbalta, aspirin   Medical: History CVA  with PFO  noted --most likely etiology.  Complete recovery.    History of depression/anxiety on medications       Surgical: 6/19/2020 closure PFO via mini thoracotomy with Dr. Kaplan    Placed on aspirin due to the PFO event.    Evaluated on 4/11/2024 with Dr. Giraldo cardiology from [facility].  4/1/2024 TTE with preserved biventricular function no significant valvular disease and intact repair of the interatrial septum.    VIDEO CONSULTATION:   I visited with Teri Call this afternoon from our clinic to her home via [employer].  We discussed her bilateral leg and foot weakness and pain.  We plan a left quadriceps muscle biopsy.  Discussed the procedure which will be performed as an outpatient in same-day surgery.  Per her request she will receive IV sedation with our anesthesia team but primarily local anesthetic.  We take a piece of the rectus muscle where it is evaluated at [facility] for the myositis and other issues.  She is aware that testing takes 2 to 3 weeks before it is completed.    She is very interested in proceeding.  Since she will be receiving IV sedation she will need someone to drive her to and from the hospital.  We will call to schedule specific time convenient for her.  We would have  her hold her prophylactic aspirin for 5 days prior to the procedure to decrease bruising.    Total time of 30 minutes including chart review of her PFO surgery and stroke event along with her cardiology follow-up with Dr. Machado postoperatively.    Video call completed at 1630 hrs.    Marcello Julian MD   This note was created using Dragon voice recognition software which may result in transcription errors.      ADDENDUM:  I did read through the neurology consultation from 8/14/2024.  She has had MRIs of the brain which were normal.  EMG was also normal to her lower extremities.  Per their note they were requesting a nerve biopsy not a muscle biopsy and we will have to clarify this.  If indeed a nerve biopsy was requested usually we send the sural nerve which would result in permanent numbness to the lateral foot.    We will call the [facility] to make sure that the appropriate biopsy is performed.    I did call the patient back and speak with him on the phone after obtaining this information.  If we indeed due to the sural nerve biopsy would still be performed as an outpatient with IV sedation under local anesthetic.  She would have permanent numbness to the distal portion of the sural nerve which be the lateral foot area and she understands this.  Will clarify this with her after speaking with family and was clinic neurology.    Marcello Julian MD

## 2024-08-22 ENCOUNTER — VIRTUAL VISIT (OUTPATIENT)
Dept: OTHER | Facility: CLINIC | Age: 39
End: 2024-08-22
Attending: SURGERY
Payer: COMMERCIAL

## 2024-08-22 DIAGNOSIS — M79.605 PAIN IN LEFT LEG: ICD-10-CM

## 2024-08-22 DIAGNOSIS — G62.9 PERIPHERAL POLYNEUROPATHY: ICD-10-CM

## 2024-08-22 DIAGNOSIS — M60.852 MYOSITIS OF LEFT THIGH, UNSPECIFIED MYOSITIS TYPE: Primary | ICD-10-CM

## 2024-08-22 DIAGNOSIS — I73.9 PERIPHERAL VASCULAR DISEASE (H): ICD-10-CM

## 2024-08-22 DIAGNOSIS — M79.604 PAIN IN RIGHT LEG: ICD-10-CM

## 2024-08-22 DIAGNOSIS — I73.9 PAD (PERIPHERAL ARTERY DISEASE) (H): ICD-10-CM

## 2024-08-22 PROCEDURE — 99203 OFFICE O/P NEW LOW 30 MIN: CPT | Mod: 95 | Performed by: SURGERY

## 2024-08-22 RX ORDER — CLONAZEPAM 0.5 MG/1
TABLET ORAL
Status: ON HOLD | COMMUNITY
End: 2024-09-03

## 2024-08-22 RX ORDER — PREGABALIN 75 MG/1
CAPSULE ORAL
COMMUNITY

## 2024-08-22 RX ORDER — OXCARBAZEPINE 150 MG/1
TABLET, FILM COATED ORAL
COMMUNITY
Start: 2024-08-15

## 2024-08-22 RX ORDER — DULOXETIN HYDROCHLORIDE 30 MG/1
30 CAPSULE, DELAYED RELEASE ORAL DAILY
COMMUNITY

## 2024-08-22 RX ORDER — ASPIRIN 325 MG
325 TABLET, DELAYED RELEASE (ENTERIC COATED) ORAL DAILY
COMMUNITY

## 2024-08-22 NOTE — PROGRESS NOTES
Teri is a 39 year old who is being evaluated via a billable video visit.    How would you like to obtain your AVS? MyChart  If the video visit is dropped, the invitation should be resent by: Text to cell phone: [phone]  Will anyone else be joining your video visit? No  {If patient encounters technical issues they should call [phone] :[phone]}      Mary Ceja MA

## 2024-08-23 ENCOUNTER — MEDICAL CORRESPONDENCE (OUTPATIENT)
Dept: HEALTH INFORMATION MANAGEMENT | Facility: CLINIC | Age: 39
End: 2024-08-23
Payer: COMMERCIAL

## 2024-08-23 NOTE — NURSING NOTE
8/23/24 @ 8:30am.    Contacted [facility] and spoke with Marisol.  Requested a new referral order be faxed specifically stating if patient needs a sural nerve biopsy vs a muscle biopsy.  Marisol stated she would clarify with referring provider and fax order once confirmed.    Breana Moore, LJN, RN, [facility]

## 2024-08-26 ENCOUNTER — MYC MEDICAL ADVICE (OUTPATIENT)
Dept: OTHER | Facility: CLINIC | Age: 39
End: 2024-08-26
Payer: COMMERCIAL

## 2024-08-26 ENCOUNTER — TELEPHONE (OUTPATIENT)
Dept: OTHER | Facility: CLINIC | Age: 39
End: 2024-08-26
Payer: COMMERCIAL

## 2024-08-26 NOTE — TELEPHONE ENCOUNTER
CASE RECEIVED ON 08/26/24 FOR:LEFT SURAL NERVE BIOPSY (PRONE POSITION)    CASE ID:[id number]    Spoke to patient states there are no dates/times to avoid at this time.

## 2024-08-26 NOTE — NURSING NOTE
"Received fax on 8/23/24 at 2:23pm stating \"referral is specifically for a small fiber nerve biopsy to assess for small fiber neuropathy\".    Per Dr. Julian, surgery order form obtained for left sural nerve biopsy via prone position.    Hold ASA 5 days prior.      "

## 2024-09-02 ENCOUNTER — TELEPHONE (OUTPATIENT)
Dept: OTHER | Facility: CLINIC | Age: 39
End: 2024-09-02
Payer: COMMERCIAL

## 2024-09-02 ENCOUNTER — ANESTHESIA EVENT (OUTPATIENT)
Dept: SURGERY | Facility: CLINIC | Age: 39
End: 2024-09-02
Payer: COMMERCIAL

## 2024-09-02 RX ORDER — CEFAZOLIN SODIUM/WATER 2 G/20 ML
2 SYRINGE (ML) INTRAVENOUS
Status: CANCELLED | OUTPATIENT
Start: 2024-09-02

## 2024-09-02 RX ORDER — CEFAZOLIN SODIUM/WATER 2 G/20 ML
2 SYRINGE (ML) INTRAVENOUS SEE ADMIN INSTRUCTIONS
Status: CANCELLED | OUTPATIENT
Start: 2024-09-02

## 2024-09-02 ASSESSMENT — LIFESTYLE VARIABLES: TOBACCO_USE: 1

## 2024-09-02 NOTE — TELEPHONE ENCOUNTER
[place] [facility]    Teri Call who had a phone consultation with me about neuropathic pain.  Originally the consult came through for a muscle biopsy.  We were able to talk to neurology and clarified that they do want a nerve biopsy.    Thus, we plan to perform a sural nerve biopsy with the patient in a prone position tomorrow morning.    I was unable to reach patient and left a message on cell phone.    No laboratory tests are required preoperatively.       Marcello Julian MD

## 2024-09-03 ENCOUNTER — APPOINTMENT (OUTPATIENT)
Dept: SURGERY | Facility: PHYSICIAN GROUP | Age: 39
End: 2024-09-03
Payer: COMMERCIAL

## 2024-09-03 ENCOUNTER — ANESTHESIA (OUTPATIENT)
Dept: SURGERY | Facility: CLINIC | Age: 39
End: 2024-09-03
Payer: COMMERCIAL

## 2024-09-03 ENCOUNTER — HOSPITAL ENCOUNTER (OUTPATIENT)
Facility: CLINIC | Age: 39
Discharge: HOME | End: 2024-09-03
Attending: SURGERY | Admitting: SURGERY
Payer: COMMERCIAL

## 2024-09-03 VITALS
WEIGHT: 167.2 LBS | DIASTOLIC BLOOD PRESSURE: 71 MMHG | HEIGHT: 71 IN | SYSTOLIC BLOOD PRESSURE: 103 MMHG | RESPIRATION RATE: 18 BRPM | OXYGEN SATURATION: 100 % | BODY MASS INDEX: 23.41 KG/M2 | HEART RATE: 61 BPM | TEMPERATURE: 97 F

## 2024-09-03 DIAGNOSIS — G62.9 NEUROPATHY: Primary | ICD-10-CM

## 2024-09-03 LAB — HCG UR QL: NEGATIVE

## 2024-09-03 PROCEDURE — 370N000017 HC ANESTHESIA TECHNICAL FEE, PER MIN: Performed by: SURGERY

## 2024-09-03 PROCEDURE — 710N000012 HC RECOVERY PHASE 2, PER MINUTE: Performed by: SURGERY

## 2024-09-03 PROCEDURE — 250N000011 HC RX IP 250 OP 636: Performed by: NURSE ANESTHETIST, CERTIFIED REGISTERED

## 2024-09-03 PROCEDURE — 272N000001 HC OR GENERAL SUPPLY STERILE: Performed by: SURGERY

## 2024-09-03 PROCEDURE — 81025 URINE PREGNANCY TEST: CPT | Performed by: ANESTHESIOLOGY

## 2024-09-03 PROCEDURE — 710N000009 HC RECOVERY PHASE 1, LEVEL 1, PER MIN: Performed by: SURGERY

## 2024-09-03 PROCEDURE — 999N000141 HC STATISTIC PRE-PROCEDURE NURSING ASSESSMENT: Performed by: SURGERY

## 2024-09-03 PROCEDURE — 64795 BIOPSY OF NERVE: CPT | Mod: LT | Performed by: SURGERY

## 2024-09-03 PROCEDURE — 64795 BIOPSY OF NERVE: CPT | Performed by: ANESTHESIOLOGY

## 2024-09-03 PROCEDURE — 360N000075 HC SURGERY LEVEL 2, PER MIN: Performed by: SURGERY

## 2024-09-03 PROCEDURE — 64795 BIOPSY OF NERVE: CPT | Performed by: NURSE ANESTHETIST, CERTIFIED REGISTERED

## 2024-09-03 PROCEDURE — 88348 ELECTRON MICROSCOPY DX: CPT | Performed by: SURGERY

## 2024-09-03 PROCEDURE — 88305 TISSUE EXAM BY PATHOLOGIST: CPT | Performed by: SURGERY

## 2024-09-03 PROCEDURE — 258N000003 HC RX IP 258 OP 636: Performed by: NURSE ANESTHETIST, CERTIFIED REGISTERED

## 2024-09-03 PROCEDURE — 88313 SPECIAL STAINS GROUP 2: CPT | Performed by: SURGERY

## 2024-09-03 PROCEDURE — 250N000009 HC RX 250: Performed by: SURGERY

## 2024-09-03 RX ORDER — SODIUM CHLORIDE, SODIUM LACTATE, POTASSIUM CHLORIDE, CALCIUM CHLORIDE 600; 310; 30; 20 MG/100ML; MG/100ML; MG/100ML; MG/100ML
INJECTION, SOLUTION INTRAVENOUS CONTINUOUS PRN
Status: DISCONTINUED | OUTPATIENT
Start: 2024-09-03 | End: 2024-09-03

## 2024-09-03 RX ORDER — FENTANYL CITRATE 0.05 MG/ML
50 INJECTION, SOLUTION INTRAMUSCULAR; INTRAVENOUS EVERY 5 MIN PRN
Status: DISCONTINUED | OUTPATIENT
Start: 2024-09-03 | End: 2024-09-03 | Stop reason: HOSPADM

## 2024-09-03 RX ORDER — ONDANSETRON 4 MG/1
4 TABLET, ORALLY DISINTEGRATING ORAL EVERY 30 MIN PRN
Status: DISCONTINUED | OUTPATIENT
Start: 2024-09-03 | End: 2024-09-03 | Stop reason: HOSPADM

## 2024-09-03 RX ORDER — DEXAMETHASONE SODIUM PHOSPHATE 4 MG/ML
INJECTION, SOLUTION INTRA-ARTICULAR; INTRALESIONAL; INTRAMUSCULAR; INTRAVENOUS; SOFT TISSUE PRN
Status: DISCONTINUED | OUTPATIENT
Start: 2024-09-03 | End: 2024-09-03

## 2024-09-03 RX ORDER — IBUPROFEN 600 MG/1
600 TABLET, FILM COATED ORAL EVERY 6 HOURS PRN
COMMUNITY
Start: 2024-09-03

## 2024-09-03 RX ORDER — ALBUTEROL SULFATE 90 UG/1
2 AEROSOL, METERED RESPIRATORY (INHALATION) EVERY 6 HOURS PRN
COMMUNITY

## 2024-09-03 RX ORDER — ONDANSETRON 2 MG/ML
INJECTION INTRAMUSCULAR; INTRAVENOUS PRN
Status: DISCONTINUED | OUTPATIENT
Start: 2024-09-03 | End: 2024-09-03

## 2024-09-03 RX ORDER — ONDANSETRON 2 MG/ML
4 INJECTION INTRAMUSCULAR; INTRAVENOUS EVERY 30 MIN PRN
Status: DISCONTINUED | OUTPATIENT
Start: 2024-09-03 | End: 2024-09-03 | Stop reason: HOSPADM

## 2024-09-03 RX ORDER — HYDROMORPHONE HCL IN WATER/PF 6 MG/30 ML
0.4 PATIENT CONTROLLED ANALGESIA SYRINGE INTRAVENOUS EVERY 5 MIN PRN
Status: DISCONTINUED | OUTPATIENT
Start: 2024-09-03 | End: 2024-09-03 | Stop reason: HOSPADM

## 2024-09-03 RX ORDER — FENTANYL CITRATE 0.05 MG/ML
25 INJECTION, SOLUTION INTRAMUSCULAR; INTRAVENOUS EVERY 5 MIN PRN
Status: DISCONTINUED | OUTPATIENT
Start: 2024-09-03 | End: 2024-09-03 | Stop reason: HOSPADM

## 2024-09-03 RX ORDER — PROPOFOL 10 MG/ML
INJECTION, EMULSION INTRAVENOUS CONTINUOUS PRN
Status: DISCONTINUED | OUTPATIENT
Start: 2024-09-03 | End: 2024-09-03

## 2024-09-03 RX ORDER — COVID-19 ANTIGEN TEST
220 KIT MISCELLANEOUS 2 TIMES DAILY PRN
COMMUNITY

## 2024-09-03 RX ORDER — HYDROMORPHONE HCL IN WATER/PF 6 MG/30 ML
0.2 PATIENT CONTROLLED ANALGESIA SYRINGE INTRAVENOUS EVERY 5 MIN PRN
Status: DISCONTINUED | OUTPATIENT
Start: 2024-09-03 | End: 2024-09-03 | Stop reason: HOSPADM

## 2024-09-03 RX ORDER — BUPIVACAINE HYDROCHLORIDE AND EPINEPHRINE 2.5; 5 MG/ML; UG/ML
INJECTION, SOLUTION INFILTRATION; PERINEURAL PRN
Status: DISCONTINUED | OUTPATIENT
Start: 2024-09-03 | End: 2024-09-03 | Stop reason: HOSPADM

## 2024-09-03 RX ORDER — NALOXONE HYDROCHLORIDE 0.4 MG/ML
0.1 INJECTION, SOLUTION INTRAMUSCULAR; INTRAVENOUS; SUBCUTANEOUS
Status: DISCONTINUED | OUTPATIENT
Start: 2024-09-03 | End: 2024-09-03 | Stop reason: HOSPADM

## 2024-09-03 RX ORDER — FENTANYL CITRATE 50 UG/ML
INJECTION, SOLUTION INTRAMUSCULAR; INTRAVENOUS PRN
Status: DISCONTINUED | OUTPATIENT
Start: 2024-09-03 | End: 2024-09-03

## 2024-09-03 RX ORDER — SODIUM CHLORIDE, SODIUM LACTATE, POTASSIUM CHLORIDE, CALCIUM CHLORIDE 600; 310; 30; 20 MG/100ML; MG/100ML; MG/100ML; MG/100ML
INJECTION, SOLUTION INTRAVENOUS CONTINUOUS
Status: DISCONTINUED | OUTPATIENT
Start: 2024-09-03 | End: 2024-09-03 | Stop reason: HOSPADM

## 2024-09-03 RX ORDER — DEXAMETHASONE SODIUM PHOSPHATE 4 MG/ML
4 INJECTION, SOLUTION INTRA-ARTICULAR; INTRALESIONAL; INTRAMUSCULAR; INTRAVENOUS; SOFT TISSUE
Status: DISCONTINUED | OUTPATIENT
Start: 2024-09-03 | End: 2024-09-03 | Stop reason: HOSPADM

## 2024-09-03 RX ORDER — PROPOFOL 10 MG/ML
INJECTION, EMULSION INTRAVENOUS PRN
Status: DISCONTINUED | OUTPATIENT
Start: 2024-09-03 | End: 2024-09-03

## 2024-09-03 RX ADMIN — PROPOFOL 150 MCG/KG/MIN: 10 INJECTION, EMULSION INTRAVENOUS at 07:25

## 2024-09-03 RX ADMIN — PROPOFOL 20 MG: 10 INJECTION, EMULSION INTRAVENOUS at 07:28

## 2024-09-03 RX ADMIN — FENTANYL CITRATE 50 MCG: 50 INJECTION INTRAMUSCULAR; INTRAVENOUS at 07:40

## 2024-09-03 RX ADMIN — MIDAZOLAM 2 MG: 1 INJECTION INTRAMUSCULAR; INTRAVENOUS at 07:24

## 2024-09-03 RX ADMIN — DEXAMETHASONE SODIUM PHOSPHATE 4 MG: 4 INJECTION, SOLUTION INTRA-ARTICULAR; INTRALESIONAL; INTRAMUSCULAR; INTRAVENOUS; SOFT TISSUE at 07:30

## 2024-09-03 RX ADMIN — SODIUM CHLORIDE, POTASSIUM CHLORIDE, SODIUM LACTATE AND CALCIUM CHLORIDE: 600; 310; 30; 20 INJECTION, SOLUTION INTRAVENOUS at 07:20

## 2024-09-03 RX ADMIN — ONDANSETRON 4 MG: 2 INJECTION INTRAMUSCULAR; INTRAVENOUS at 07:30

## 2024-09-03 ASSESSMENT — ACTIVITIES OF DAILY LIVING (ADL)
ADLS_ACUITY_SCORE: 35
ADLS_ACUITY_SCORE: 35
ADLS_ACUITY_SCORE: 33
ADLS_ACUITY_SCORE: 35

## 2024-09-03 NOTE — DISCHARGE INSTRUCTIONS
Remove dressing on lower left leg in 3-4 days.     Reasons to contact your surgeon:    Signs of possible infection: Check your incision daily for redness, swelling, warmth, red streaks or foul drainage.   Elevated temperature.  Pain not controlled with pain medication and/or rest.   Uncontrolled nausea or vomiting.  Any questions or concerns.      Same Day Surgery Discharge Instructions for  Sedation and General Anesthesia     It's not unusual to feel dizzy, light-headed or faint for up to 24 hours after surgery or while taking pain medication.  If you have these symptoms: sit for a few minutes before standing and have someone assist you when you get up to walk or use the bathroom.    You should rest and relax for the next 24 hours. We recommend you make arrangements to have an adult stay with you for at least 24 hours after your discharge.  Avoid hazardous and strenuous activity.    DO NOT DRIVE any vehicle or operate mechanical equipment for 24 hours following the end of your surgery.  Even though you may feel normal, your reactions may be affected by the medication you have received.    Do not drink alcoholic beverages for 24 hours following surgery.     Slowly progress to your regular diet as you feel able. It's not unusual to feel nauseated and/or vomit after receiving anesthesia.  If you develop these symptoms, drink clear liquids (apple juice, ginger ale, broth, 7-up, etc. ) until you feel better.  If your nausea and vomiting persists for 24 hours, please notify your surgeon.      All narcotic pain medications, along with inactivity and anesthesia, can cause constipation. Drinking plenty of liquids and increasing fiber intake will help.    For any questions of a medical nature, call your surgeon.    Do not make important decisions for 24 hours.    If you had general anesthesia, you may have a sore throat for a couple of days related to the breathing tube used during surgery.  You may use Cepacol lozenges to  help with this discomfort.  If it worsens or if you develop a fever, contact your surgeon.     If you feel your pain is not well managed with the pain medications prescribed by your surgeon, please contact your surgeon's office to let them know so they can address your concerns.      **If you have questions or concerns about your procedure  call Dr Marcello Julian at [phone]**

## 2024-09-03 NOTE — OP NOTE
OPERATIVE NOTE    PROCEDURE DATE: 9/3/2024      PRE-OP DIAGNOSIS: Progressive neuropathic leg pain      POST-OP DIAGNOSES: Same      PROCEDURE PERFORMED: Left excisional sural nerve biopsy      SURGEON:  Marcello Julian M.D.      ANESTHESIA:  Local with MAC      PRE-OP MEDICATIONS: None      INDICATIONS FOR PROCEDURE: 39-year-old patient in good health is noted progressive worsening left greater than right neuropathic pain in her legs.  She has been evaluated at [facility].  They have recommended a sural nerve biopsy.  Risk benefits reviewed with the patient on video visit the clinic and again today.  Aware there may be permanent numbness in the distal sural nerve distribution following excisional biopsy.      DESCRIPTION OF PROCEDURE: Brought the op room placed supine.  Duplex ultrasound was used to darcie the lesser saphenous vein in the distal ankle.  The left leg was prepped and draped.  Timeout was called and the sites were identified.    1% lidocaine was injected to fill block fashion.  A vertical incision was made over the distal ankle with a 15 blade scalpel.  Electrocautery was used for hemostasis.  We dissected down to a identified the lesser saphenous vein.  Just medial to this there was the sural nerve.  Clinically this was unremarkable.  This was cautiously manipulated and with transection patient experienced no discomfort at all which is somewhat unusual.  We removed a 3 cm segment.  The proximal portion was marked with blue ink and this placed and sterile saline.    Wound was anesthetized with 0.5% Marcaine.  Subcutaneous tissue approximated interrupted 3-0 Vicryl and skin was closed with 3-0 Monocryl in a subcu fashion followed by Tegaderm dressing.    On the back table the proximal portion of the nerve was transected and placed within the EM fixative.  The other segment was placed on a sterile tongue blade with sterile saline and placed within a urine cup.  Both of these were  delivered  to the pathology lab by myself for transfer to [facility] for further evaluation.        Patient tolerated procedure well.      EBL: Less than 1 mL      COMPLICATIONS: None      OPERATIVE FINDINGS: Clinically unremarkable nerve but no sensation with transection.  Patient will follow-up in several weeks once the pathology is available with [facility].      Marcello Julian MD

## 2024-09-03 NOTE — ANESTHESIA PREPROCEDURE EVALUATION
Anesthesia Pre-Procedure Evaluation    Patient: Teri Call   MRN: [id number] : 1985        Procedure : Procedure(s):  LEFT SURAL NERVE BIOPSY          Past Medical History:   Diagnosis Date    Acute CVA (cerebrovascular accident) (H) 2020    ischemic vs migraine sequelae  R occipital lobe    Anxiety     Depression     Migraine     PFO (patent foramen ovale) 2020    PFO  and ASD near IVC (?prob not ivc sinus venosus asd) windsock aneurysm    Pyelonephritis       Past Surgical History:   Procedure Laterality Date    ENT SURGERY      T&A    GYN SURGERY      C section x1    ORTHOPEDIC SURGERY      left knee meniscus repair    REPAIR PATENT FORAMEN OVALE N/A 2020    separate closure of pfo and asd (by ivc) minithoracotomy dr ruth    SOFT TISSUE SURGERY      cyst excision right ovary      Allergies   Allergen Reactions    Ciprofloxacin Swelling    Sulfa Antibiotics Anaphylaxis    Buspirone       Social History     Tobacco Use    Smoking status: Some Days     Types: Cigarettes    Smokeless tobacco: Never    Tobacco comments:     a couple cigarettes daily   Substance Use Topics    Alcohol use: Not Currently      Wt Readings from Last 1 Encounters:   20 70 kg (154 lb 5.2 oz)        Anesthesia Evaluation            ROS/MED HX  ENT/Pulmonary:     (+)                tobacco use, Current use,                    (-) asthma   Neurologic:     (+)    peripheral neuropathy, - undiagnosed poly. migraines,    CVA,  without deficits,  TIA,                  Cardiovascular:     (+)  - -   -  - -                              congenital heart disease PFO. Closed.  Previous cardiac testing   Echo: Date:  Results:     Interpretation Summary     There is a atrial septum closure device present. No visible residual shunt by  color, negative bubble study with and without Valsalva.  Otherwise normal adult cardiac  echo.  _____________________________________________________________________________  __        Left Ventricle  The left ventricle is normal in size. There is normal left ventricular wall  thickness. The left ventricular ejection fraction is normal. The visual  ejection fraction is estimated at 55-60%. Diastolic Doppler findings (E/E'  ratio and/or other parameters) suggest left ventricular filling pressures are  normal. No regional wall motion abnormalities noted.     Right Ventricle  The right ventricle is normal in structure, function and size.     Atria  Normal left atrial size. Right atrial size is normal. Closure by device,  secundum atrial septal defect. A contrast injection (Bubble Study) was  performed that was negative for flow across the interatrial septum. A Valsalva  maneuver was performed.     Mitral Valve  The mitral valve is normal in structure and function.     Tricuspid Valve  The tricuspid valve is normal in structure and function.        Aortic Valve  The aortic valve is normal in structure and function.     Pulmonic Valve  Normal pulmonic valve.     Vessels  Normal size aorta.     Pericardium  There is no pericardial effusion.     Rhythm  Sinus rhythm was noted.    Stress Test:  Date: Results:    ECG Reviewed:  Date: Results:    Cath:  Date: Results:   (-) hypertension, CAD and dyslipidemia   METS/Exercise Tolerance:     Hematologic:       Musculoskeletal:       GI/Hepatic:       Renal/Genitourinary: Comment: H/O pyelonephritis   (-) renal disease   Endo:       Psychiatric/Substance Use:     (+) psychiatric history anxiety and depression       Infectious Disease:       Malignancy:    (-) malignancy   Other:            Physical Exam    Airway        Mallampati: II   TM distance: > 3 FB   Neck ROM: full   Mouth opening: > 3 cm    Respiratory Devices and Support         Dental       (+) Edentulous      Cardiovascular   cardiovascular exam normal          Pulmonary   pulmonary exam normal                 OUTSIDE LABS:  CBC:   Lab Results   Component Value Date    WBC 7.2 06/22/2020    WBC 8.2 06/21/2020    HGB 11.7 06/22/2020    HGB 11.3 (L) 06/21/2020    HCT 35.0 06/22/2020    HCT 33.8 (L) 06/21/2020     06/23/2020     06/22/2020     BMP:   Lab Results   Component Value Date     06/22/2020     06/20/2020    POTASSIUM 3.7 06/23/2020    POTASSIUM 3.6 06/22/2020    CHLORIDE 107 06/22/2020    CHLORIDE 109 06/20/2020    CO2 26 06/22/2020    CO2 25 06/20/2020    BUN 8 06/22/2020    BUN 6 (L) 06/20/2020    CR 0.70 06/22/2020    CR 0.68 06/20/2020    GLC 87 06/22/2020    GLC 96 06/20/2020     COAGS:   Lab Results   Component Value Date    PTT 28 06/19/2020    INR 1.51 (H) 06/19/2020    FIBR 195 (L) 06/19/2020     POC:   Lab Results   Component Value Date    BGM 95 06/23/2020    HCG Negative 06/19/2020    HCGS Negative 04/24/2020     HEPATIC:   Lab Results   Component Value Date    ALBUMIN 3.0 (L) 06/20/2020    PROTTOTAL 5.3 (L) 06/20/2020    ALT 18 06/20/2020    AST 38 06/20/2020    ALKPHOS 52 06/20/2020    BILITOTAL 1.3 06/20/2020     OTHER:   Lab Results   Component Value Date    PH 7.32 (L) 06/19/2020    LACT 0.9 06/19/2020    A1C 5.2 06/17/2020    THIAGO 8.8 06/22/2020    PHOS 3.6 06/20/2020    MAG 2.4 (H) 06/20/2020    TSH 1.48 04/24/2020    CRP <2.9 04/25/2020    SED 4 04/25/2020       Anesthesia Plan    ASA Status:  2    NPO Status:  NPO Appropriate    Anesthesia Type: MAC.     - Reason for MAC: straight local not clinically adequate   Induction: Intravenous, Propofol.           Consents    Anesthesia Plan(s) and associated risks, benefits, and realistic alternatives discussed. Questions answered and patient/representative(s) expressed understanding.     - Discussed:     - Discussed with:  Patient      - Extended Intubation/Ventilatory Support Discussed: No.      - Patient is DNR/DNI Status: No     Use of blood products discussed: No .     Postoperative Care    Pain management: IV  analgesics, Multi-modal analgesia.   PONV prophylaxis: Ondansetron (or other 5HT-3), Dexamethasone or Solumedrol     Comments:    Other Comments: Patient is advised to quit smoking and to seek primary care physician help if needed. Advised of anesthesia risks associated with cigarette use.                 Ross Carbone MD    I have reviewed the pertinent notes and labs in the chart from the past 30 days and (re)examined the patient.  Any updates or changes from those notes are reflected in this note.

## 2024-09-03 NOTE — ANESTHESIA CARE TRANSFER NOTE
Patient: Teri Call    Procedure: Procedure(s):  LEFT SURAL NERVE BIOPSY       Diagnosis: Peripheral polyneuropathy [G62.9]  Diagnosis Additional Information: No value filed.    Anesthesia Type:   MAC     Note:    Oropharynx: oropharynx clear of all foreign objects and spontaneously breathing  Level of Consciousness: awake  Oxygen Supplementation: room air    Independent Airway: airway patency satisfactory and stable  Dentition: dentition unchanged  Vital Signs Stable: post-procedure vital signs reviewed and stable  Report to RN Given: handoff report given  Patient transferred to: PACU    Handoff Report: Identifed the Patient, Identified the Reponsible Provider, Reviewed the pertinent medical history, Discussed the surgical course, Reviewed Intra-OP anesthesia mangement and issues during anesthesia, Set expectations for post-procedure period and Allowed opportunity for questions and acknowledgement of understanding            Electronically Signed By: ELZA Kirkpatrick CRNA  September 3, 2024  8:14 AM

## 2024-09-03 NOTE — OR NURSING
Discharge instructions reviewed with Gene,  over the phone in presence of pt. All questions answered. No further concerns at this point. Teach back method used to ensure patient and family/friend understanding. OTC Ibuprofen ordered to be taken at home. IV removed. All belongings returned. Pt escorted out of hospital via wheelchair.    Work note sent home with pt.

## 2024-09-03 NOTE — ANESTHESIA POSTPROCEDURE EVALUATION
Patient: Teri Call    Procedure: Procedure(s):  LEFT SURAL NERVE BIOPSY       Anesthesia Type:  MAC    Note:  Disposition: Outpatient   Postop Pain Control: Uneventful            Sign Out: Well controlled pain   PONV:    Neuro/Psych: Uneventful            Sign Out: Acceptable/Baseline neuro status   Airway/Respiratory: Uneventful            Sign Out: Acceptable/Baseline resp. status   CV/Hemodynamics: Uneventful            Sign Out: Acceptable CV status   Other NRE: NONE   DID A NON-ROUTINE EVENT OCCUR? No           Last vitals:  Vitals Value Taken Time   BP 98/63 09/03/24 0845   Temp 36.1  C (97  F) 09/03/24 0845   Pulse 58 09/03/24 0845   Resp 20 09/03/24 0845   SpO2 100 % 09/03/24 0845       Electronically Signed By: Ross Carbone MD  September 3, 2024  9:03 AM

## 2024-09-03 NOTE — LETTER
[facility]/PHASE II  [address]  [facility] [address]  Phone: [phone]    September 3, 2024        Teri Call  [address] [facility] [address]          To whom it may concern:    RE: Teri Call    Patient was seen today and treated in the hospital. Patient may return to work 9/5/24 with the following:  No restrictions    Please contact me for questions or concerns.      Sincerely,      Shamika Dunn RN of Dr. Marcello Julian

## 2024-09-09 ENCOUNTER — TELEPHONE (OUTPATIENT)
Dept: OTHER | Facility: CLINIC | Age: 39
End: 2024-09-09
Payer: COMMERCIAL

## 2024-09-09 NOTE — TELEPHONE ENCOUNTER
[facility]    I called Teri STUART Obrienpreston after her left sural nerve biopsy last week.  I left a message on her cell phone this afternoon.    [place] [facility]  She will follow-up with [facility], [facility].    I told her to call if there is any concerns.       Marcello Julian MD

## 2024-09-16 ENCOUNTER — TRANSCRIBE ORDERS (OUTPATIENT)
Dept: OTHER | Age: 39
End: 2024-09-16

## 2024-09-16 DIAGNOSIS — M79.604 PAIN IN RIGHT LEG: ICD-10-CM

## 2024-09-16 DIAGNOSIS — I73.9 PAD (PERIPHERAL ARTERY DISEASE) (H): Primary | ICD-10-CM

## 2024-09-16 DIAGNOSIS — I73.9 PERIPHERAL VASCULAR DISEASE (H): ICD-10-CM

## 2024-09-16 DIAGNOSIS — M79.605 PAIN IN LEFT LEG: ICD-10-CM

## 2024-09-20 LAB — SPECIMEN STATUS: NORMAL

## 2024-09-22 ENCOUNTER — HEALTH MAINTENANCE LETTER (OUTPATIENT)
Age: 39
End: 2024-09-22

## 2025-03-12 NOTE — PROGRESS NOTES
LVM to call back.    PTA medications updated by Medication Scribe prior to surgery via phone call with patient      -LAST DOSES ENTERED BY NURSE-    Medication history sources: Patient, Surescripts and H&P  Medication history source reliability: Good  Adherence assessment: N/A Not Observed    Significant changes made to the medication list:  None      Additional medication history information:   None        Prior to Admission medications    Medication Sig Last Dose Taking? Auth Provider   aspirin (ASA) 325 MG EC tablet Take 325 mg by mouth every morning  at AM Yes Reported, Patient   LORazepam (ATIVAN) 0.5 MG tablet Take 0.5 mg by mouth daily as needed for anxiety  at PRN Yes Reported, Patient   naproxen sodium (ANAPROX) 220 MG tablet Take 220-440 mg by mouth 2 times daily as needed   at PRN Yes Unknown, Entered By History   venlafaxine (EFFEXOR-XR) 75 MG 24 hr capsule Take 75 mg by mouth 2 times daily as needed (Patient taking differently than prescribed : RX states 75mg twice daily)  Yes Reported, Patient

## 2025-03-13 ENCOUNTER — MEDICAL CORRESPONDENCE (OUTPATIENT)
Dept: HEALTH INFORMATION MANAGEMENT | Facility: CLINIC | Age: 40
End: 2025-03-13
Payer: COMMERCIAL

## 2025-04-17 ENCOUNTER — MEDICAL CORRESPONDENCE (OUTPATIENT)
Dept: HEALTH INFORMATION MANAGEMENT | Facility: CLINIC | Age: 40
End: 2025-04-17
Payer: COMMERCIAL

## 2025-05-01 ENCOUNTER — ANCILLARY PROCEDURE (OUTPATIENT)
Dept: ULTRASOUND IMAGING | Facility: CLINIC | Age: 40
End: 2025-05-01
Attending: FAMILY MEDICINE
Payer: COMMERCIAL

## 2025-05-01 DIAGNOSIS — M79.2 NEURALGIA AND NEURITIS, UNSPECIFIED: ICD-10-CM

## 2025-05-01 DIAGNOSIS — G89.18 OTHER ACUTE POSTPROCEDURAL PAIN: ICD-10-CM

## 2025-05-01 PROCEDURE — 76882 US LMTD JT/FCL EVL NVASC XTR: CPT | Performed by: RADIOLOGY

## (undated) DEVICE — LINEN TOWEL PACK X5 5464

## (undated) DEVICE — Device

## (undated) DEVICE — DRAPE LAP W/ARMBOARD 29410

## (undated) DEVICE — SU ETHIBOND 5 V-37 4X30" MB66G

## (undated) DEVICE — DRAPE MAYO STAND 23X54 8337

## (undated) DEVICE — CANNULA 25FR MULIT-STAGE 96880-25

## (undated) DEVICE — SU VICRYL 3-0 TIE 12X18" J904T

## (undated) DEVICE — GLOVE GAMMEX DERMAPRENE ULTRA SZ 8 LF 8516

## (undated) DEVICE — DECANTER VIAL 2006S

## (undated) DEVICE — KIT WASH CELL SAVING ATL2001

## (undated) DEVICE — PROTECTOR ARM ONE-STEP TRENDELENBURG 40418

## (undated) DEVICE — SU SILK 2-0 FS-1 18" 685G

## (undated) DEVICE — RESERVOIR CELL SAVING BLOOD COLLECTION EL2120

## (undated) DEVICE — KIT VASC DILATOR ESTECH 200-120

## (undated) DEVICE — SYR 10ML LL W/O NDL 302995

## (undated) DEVICE — LEAD PACER MYOCARDIAL BIPOLAR TEMPORARY 53CM 6495F

## (undated) DEVICE — DECANTER BAG 2002S

## (undated) DEVICE — SU PLEDGET SOFT TFE 3/8"X3/26"X1/16" PCP40

## (undated) DEVICE — SU PROLENE 4-0 SHDA 36" 8521H

## (undated) DEVICE — COVER LIGHT HANDLE  HILL-ROM C LT-C02

## (undated) DEVICE — SU SILK 1 TIE 10X30" SA87G

## (undated) DEVICE — ESU ELEC BLADE 6" COATED/INSULATED E1455-6

## (undated) DEVICE — CONNECTOR PERFUSION Y 3/8X3/8" W/O LL 6031

## (undated) DEVICE — CANNULA AORTIC ROOT 12GA 11012L

## (undated) DEVICE — SU MONOCRYL 3-0 PS-2 27" Y427H

## (undated) DEVICE — INSERT INTRAC 66MM XT CYGNET N-10174

## (undated) DEVICE — SU VICRYL 3-0 FS-1 27" J442H

## (undated) DEVICE — SYR 10ML FINGER CONTROL W/O NDL 309695

## (undated) DEVICE — COVER TABLE POLY 65X90" 8186

## (undated) DEVICE — SOL WATER IRRIG 1000ML BOTTLE 2F7114

## (undated) DEVICE — SOMASENSOR CEREBRAL OXIMETER ADULT SAFB-SM

## (undated) DEVICE — SU ETHIBOND 0 CT-1 CR 8X18" CX21D

## (undated) DEVICE — SU VICRYL 4-0 PS-2 18" UND J496H

## (undated) DEVICE — NDL 19GA 1.5"

## (undated) DEVICE — ORGANIZER SUTURE CIRCUMFERENTIAL BELT MI-ISBA-001

## (undated) DEVICE — VALVE VRV 9156R2

## (undated) DEVICE — CANNULA PERFUSION 24FR SGL STAGE RT ANGLE 69324

## (undated) DEVICE — ENDO DISSECTOR BLUNT 10MM CHERRY BCD10

## (undated) DEVICE — SOL NACL 0.9% IRRIG 1000ML BOTTLE 2F7124

## (undated) DEVICE — TUBING PERFUSION 1/4X1/16X8FT

## (undated) DEVICE — SU POLYESTER TAPE 30" 8618-00

## (undated) DEVICE — SURGICEL HEMOSTAT 2X14" 1951

## (undated) DEVICE — GLOVE BIOGEL PI ULTRATOUCH SZ 7.5 41175

## (undated) DEVICE — ENDO DISSECTOR BLUNT 05MM  BTD05

## (undated) DEVICE — SU VICRYL 2-0 CT-1 27" UND J259H

## (undated) DEVICE — CONNECTOR BLAKE DRAIN SGL BCC1

## (undated) DEVICE — TUBING SUCTION MINISQUAIR SMOKE EVAC NONSTERILE SQNS20018-01

## (undated) DEVICE — SUCTION DRY CHEST DRAIN OASIS 3600-100

## (undated) DEVICE — TUBING SET IV ADMIN 8' PRECUT 3508S

## (undated) DEVICE — LINEN TOWEL PACK X30 5481

## (undated) DEVICE — SU DEVICE COR KNOT KIT STD SHORT 2/KIT 030884

## (undated) DEVICE — SUCTION CANISTER MEDIVAC LINER 3000ML W/LID 65651-530

## (undated) DEVICE — CUP MEDICINE METAL 2OZ STERILE

## (undated) DEVICE — GLOVE PROTEXIS W/NEU-THERA 7.5  2D73TE75

## (undated) DEVICE — PACK MINI VAC CUSTOM CARDOPULMONARY BB5Z97R15

## (undated) DEVICE — DEVICE ASSEMBLY SUCTION/ANTI COAG BTC93

## (undated) DEVICE — ESU PENCIL W/SMOKE EVAC NEPTUNE STRYKER 0703-046-000

## (undated) DEVICE — SU VICRYL 3-0 SH 27" J316H

## (undated) DEVICE — PACK OPEN HEART PV12OH524

## (undated) DEVICE — SU ETHIBOND 3-0 BBDA 36" X588H

## (undated) DEVICE — PACK TUBING MINI VAC CUSTOM 3/8X3/8T BB7V94R1

## (undated) DEVICE — ENDO TROCAR FIRST ENTRY KII FIOS Z-THRD 05X100MM CTF03

## (undated) DEVICE — DRAIN CHEST TUBE EXTENDED STR LF 19907

## (undated) DEVICE — DRAIN SUMP INTRACARDIAC 20FR 12" POOL TIP 12112

## (undated) DEVICE — DRAIN CHEST TUBE 28FR STR 8028

## (undated) DEVICE — CABLE MYO/LEAD PACING WHITE DISP 019-530

## (undated) DEVICE — SU VICRYL 0 CTX 36" J370H

## (undated) DEVICE — TUBING SUCTION 12"X1/4" N612

## (undated) DEVICE — CAST PADDING 4" STERILE 9044S

## (undated) DEVICE — PACK MINOR SBA15MIFSE

## (undated) DEVICE — PREP CHLORAPREP 26ML TINTED HI-LITE ORANGE 930815

## (undated) DEVICE — SU PROLENE 5-0 RB-2DA 30" 8710H

## (undated) DEVICE — SU PROLENE 4-0 RB-1DA 36" 8557H

## (undated) DEVICE — SU UMBILICAL TAPE 36X.125" U12T

## (undated) DEVICE — TONGUE DEPRESSOR STERILE 6023

## (undated) DEVICE — SUCTION CATH AIRLIFE TRI-FLO W/CONTROL PORT 14FR  T60C

## (undated) DEVICE — ADAPTER CARDIOPLG CLAMP 7.5" DLP .25" CON 10005

## (undated) RX ORDER — METOPROLOL TARTRATE 25 MG/1
TABLET, FILM COATED ORAL
Status: DISPENSED
Start: 2020-06-19

## (undated) RX ORDER — PROTAMINE SULFATE 10 MG/ML
INJECTION, SOLUTION INTRAVENOUS
Status: DISPENSED
Start: 2020-06-19

## (undated) RX ORDER — FAMOTIDINE 20 MG/1
TABLET, FILM COATED ORAL
Status: DISPENSED
Start: 2020-06-19

## (undated) RX ORDER — ONDANSETRON 2 MG/ML
INJECTION INTRAMUSCULAR; INTRAVENOUS
Status: DISPENSED
Start: 2024-09-03

## (undated) RX ORDER — EPINEPHRINE 1 MG/ML
INJECTION, SOLUTION INTRAMUSCULAR; SUBCUTANEOUS
Status: DISPENSED
Start: 2024-09-03

## (undated) RX ORDER — CEFAZOLIN SODIUM 1 G/3ML
INJECTION, POWDER, FOR SOLUTION INTRAMUSCULAR; INTRAVENOUS
Status: DISPENSED
Start: 2020-06-19

## (undated) RX ORDER — DEXAMETHASONE SODIUM PHOSPHATE 4 MG/ML
INJECTION, SOLUTION INTRA-ARTICULAR; INTRALESIONAL; INTRAMUSCULAR; INTRAVENOUS; SOFT TISSUE
Status: DISPENSED
Start: 2024-09-03

## (undated) RX ORDER — FENTANYL CITRATE 50 UG/ML
INJECTION, SOLUTION INTRAMUSCULAR; INTRAVENOUS
Status: DISPENSED
Start: 2020-06-19

## (undated) RX ORDER — PROPOFOL 10 MG/ML
INJECTION, EMULSION INTRAVENOUS
Status: DISPENSED
Start: 2020-06-19

## (undated) RX ORDER — GLYCOPYRROLATE 0.2 MG/ML
INJECTION, SOLUTION INTRAMUSCULAR; INTRAVENOUS
Status: DISPENSED
Start: 2020-06-19

## (undated) RX ORDER — VECURONIUM BROMIDE 1 MG/ML
INJECTION, POWDER, LYOPHILIZED, FOR SOLUTION INTRAVENOUS
Status: DISPENSED
Start: 2020-06-19

## (undated) RX ORDER — PROPOFOL 10 MG/ML
INJECTION, EMULSION INTRAVENOUS
Status: DISPENSED
Start: 2024-09-03

## (undated) RX ORDER — LIDOCAINE HYDROCHLORIDE 20 MG/ML
INJECTION, SOLUTION EPIDURAL; INFILTRATION; INTRACAUDAL; PERINEURAL
Status: DISPENSED
Start: 2020-06-19

## (undated) RX ORDER — VANCOMYCIN HYDROCHLORIDE 1 G/20ML
INJECTION, POWDER, LYOPHILIZED, FOR SOLUTION INTRAVENOUS
Status: DISPENSED
Start: 2020-06-19

## (undated) RX ORDER — MUPIROCIN 20 MG/G
OINTMENT TOPICAL
Status: DISPENSED
Start: 2020-06-19

## (undated) RX ORDER — CEFAZOLIN SODIUM 2 G/100ML
INJECTION, SOLUTION INTRAVENOUS
Status: DISPENSED
Start: 2020-06-19

## (undated) RX ORDER — FENTANYL CITRATE 50 UG/ML
INJECTION, SOLUTION INTRAMUSCULAR; INTRAVENOUS
Status: DISPENSED
Start: 2024-09-03

## (undated) RX ORDER — LIDOCAINE HYDROCHLORIDE 10 MG/ML
INJECTION, SOLUTION INFILTRATION; PERINEURAL
Status: DISPENSED
Start: 2024-09-03

## (undated) RX ORDER — BUPIVACAINE HYDROCHLORIDE 5 MG/ML
INJECTION, SOLUTION EPIDURAL; INTRACAUDAL
Status: DISPENSED
Start: 2020-06-19

## (undated) RX ORDER — FENTANYL CITRATE 0.05 MG/ML
INJECTION, SOLUTION INTRAMUSCULAR; INTRAVENOUS
Status: DISPENSED
Start: 2020-06-19

## (undated) RX ORDER — HEPARIN SODIUM 1000 [USP'U]/ML
INJECTION, SOLUTION INTRAVENOUS; SUBCUTANEOUS
Status: DISPENSED
Start: 2020-06-19

## (undated) RX ORDER — ONDANSETRON 2 MG/ML
INJECTION INTRAMUSCULAR; INTRAVENOUS
Status: DISPENSED
Start: 2020-06-19

## (undated) RX ORDER — BUPIVACAINE HYDROCHLORIDE 2.5 MG/ML
INJECTION, SOLUTION EPIDURAL; INFILTRATION; INTRACAUDAL
Status: DISPENSED
Start: 2024-09-03